# Patient Record
Sex: MALE | Race: BLACK OR AFRICAN AMERICAN
[De-identification: names, ages, dates, MRNs, and addresses within clinical notes are randomized per-mention and may not be internally consistent; named-entity substitution may affect disease eponyms.]

---

## 2022-03-29 ENCOUNTER — HOSPITAL ENCOUNTER (EMERGENCY)
Dept: HOSPITAL 26 - MED | Age: 57
Discharge: HOME | End: 2022-03-29
Payer: COMMERCIAL

## 2022-03-29 VITALS — WEIGHT: 179 LBS | HEIGHT: 69 IN | BODY MASS INDEX: 26.51 KG/M2

## 2022-03-29 VITALS — SYSTOLIC BLOOD PRESSURE: 167 MMHG | DIASTOLIC BLOOD PRESSURE: 100 MMHG

## 2022-03-29 VITALS — DIASTOLIC BLOOD PRESSURE: 118 MMHG | SYSTOLIC BLOOD PRESSURE: 213 MMHG

## 2022-03-29 DIAGNOSIS — R10.9: Primary | ICD-10-CM

## 2022-03-29 DIAGNOSIS — R19.7: ICD-10-CM

## 2022-03-29 DIAGNOSIS — R11.2: ICD-10-CM

## 2022-03-29 PROCEDURE — 81002 URINALYSIS NONAUTO W/O SCOPE: CPT

## 2022-03-29 PROCEDURE — 99285 EMERGENCY DEPT VISIT HI MDM: CPT

## 2022-03-29 PROCEDURE — 96372 THER/PROPH/DIAG INJ SC/IM: CPT

## 2022-04-08 ENCOUNTER — HOSPITAL ENCOUNTER (EMERGENCY)
Dept: HOSPITAL 26 - MED | Age: 57
Discharge: HOME | End: 2022-04-08
Payer: COMMERCIAL

## 2022-04-08 VITALS — SYSTOLIC BLOOD PRESSURE: 187 MMHG | DIASTOLIC BLOOD PRESSURE: 120 MMHG

## 2022-04-08 VITALS — DIASTOLIC BLOOD PRESSURE: 120 MMHG | SYSTOLIC BLOOD PRESSURE: 187 MMHG

## 2022-04-08 VITALS — WEIGHT: 177 LBS | BODY MASS INDEX: 26.22 KG/M2 | HEIGHT: 69 IN

## 2022-04-08 DIAGNOSIS — Z79.899: ICD-10-CM

## 2022-04-08 DIAGNOSIS — I10: ICD-10-CM

## 2022-04-08 DIAGNOSIS — R06.6: Primary | ICD-10-CM

## 2022-04-08 PROCEDURE — 99283 EMERGENCY DEPT VISIT LOW MDM: CPT

## 2022-04-08 PROCEDURE — 96372 THER/PROPH/DIAG INJ SC/IM: CPT

## 2022-04-08 NOTE — NUR
Patient discharged with v/s stable. Written and verbal after care instructions 
given and explained. 

Patient alert, oriented and verbalized understanding of instructions. 
Ambulatory with steady gait. All questions addressed prior to discharge. ID 
band removed. Patient advised to follow up with PMD. Rx of metoclopramide, 
lisinopril given. Patient educated on indication of medication including 
possible reaction and side effects. Opportunity to ask questions provided and 
answered.

## 2022-04-09 ENCOUNTER — HOSPITAL ENCOUNTER (INPATIENT)
Dept: HOSPITAL 26 - MED | Age: 57
LOS: 2 days | Discharge: HOME | DRG: 305 | End: 2022-04-11
Attending: STUDENT IN AN ORGANIZED HEALTH CARE EDUCATION/TRAINING PROGRAM | Admitting: STUDENT IN AN ORGANIZED HEALTH CARE EDUCATION/TRAINING PROGRAM
Payer: COMMERCIAL

## 2022-04-09 VITALS — SYSTOLIC BLOOD PRESSURE: 143 MMHG | DIASTOLIC BLOOD PRESSURE: 92 MMHG

## 2022-04-09 VITALS — SYSTOLIC BLOOD PRESSURE: 189 MMHG | DIASTOLIC BLOOD PRESSURE: 112 MMHG

## 2022-04-09 VITALS — HEIGHT: 69 IN | WEIGHT: 174 LBS | BODY MASS INDEX: 25.77 KG/M2

## 2022-04-09 VITALS — SYSTOLIC BLOOD PRESSURE: 176 MMHG | DIASTOLIC BLOOD PRESSURE: 117 MMHG

## 2022-04-09 DIAGNOSIS — I10: ICD-10-CM

## 2022-04-09 DIAGNOSIS — Z20.822: ICD-10-CM

## 2022-04-09 DIAGNOSIS — F12.10: ICD-10-CM

## 2022-04-09 DIAGNOSIS — I16.1: Primary | ICD-10-CM

## 2022-04-09 DIAGNOSIS — E87.6: ICD-10-CM

## 2022-04-09 DIAGNOSIS — Z79.899: ICD-10-CM

## 2022-04-09 DIAGNOSIS — R11.2: ICD-10-CM

## 2022-04-09 DIAGNOSIS — E83.51: ICD-10-CM

## 2022-04-09 DIAGNOSIS — E78.5: ICD-10-CM

## 2022-04-09 DIAGNOSIS — R06.6: ICD-10-CM

## 2022-04-09 LAB
ALBUMIN FLD-MCNC: 3.7 G/DL (ref 3.4–5)
ANION GAP SERPL CALCULATED.3IONS-SCNC: 12.1 MMOL/L (ref 8–16)
AST SERPL-CCNC: 16 U/L (ref 15–37)
BASOPHILS # BLD AUTO: 0 K/UL (ref 0–0.22)
BASOPHILS NFR BLD AUTO: 0.3 % (ref 0–2)
BILIRUB SERPL-MCNC: 0.7 MG/DL (ref 0–1)
BUN SERPL-MCNC: 17 MG/DL (ref 7–18)
CHLORIDE SERPL-SCNC: 101 MMOL/L (ref 98–107)
CHOLEST/HDLC SERPL: 3.6 {RATIO} (ref 1–4.5)
CO2 SERPL-SCNC: 28.3 MMOL/L (ref 21–32)
CREAT SERPL-MCNC: 1.3 MG/DL (ref 0.6–1.3)
EOSINOPHIL # BLD AUTO: 0 K/UL (ref 0–0.4)
EOSINOPHIL NFR BLD AUTO: 0.3 % (ref 0–4)
ERYTHROCYTE [DISTWIDTH] IN BLOOD BY AUTOMATED COUNT: 14.8 % (ref 11.6–13.7)
GFR SERPL CREATININE-BSD FRML MDRD: 73 ML/MIN (ref 90–?)
GLUCOSE SERPL-MCNC: 112 MG/DL (ref 74–106)
HCT VFR BLD AUTO: 45.8 % (ref 36–52)
HDLC SERPL-MCNC: 54 MG/DL (ref 40–60)
HGB BLD-MCNC: 15.3 G/DL (ref 12–18)
LDLC SERPL CALC-MCNC: 128 MG/DL (ref 60–100)
LYMPHOCYTES # BLD AUTO: 2.6 K/UL (ref 2–11.5)
LYMPHOCYTES NFR BLD AUTO: 37.5 % (ref 20.5–51.1)
MCH RBC QN AUTO: 29 PG (ref 27–31)
MCHC RBC AUTO-ENTMCNC: 33 G/DL (ref 33–37)
MCV RBC AUTO: 86.7 FL (ref 80–94)
MONOCYTES # BLD AUTO: 0.5 K/UL (ref 0.8–1)
MONOCYTES NFR BLD AUTO: 7.9 % (ref 1.7–9.3)
NEUTROPHILS # BLD AUTO: 3.7 K/UL (ref 1.8–7.7)
NEUTROPHILS NFR BLD AUTO: 54 % (ref 42.2–75.2)
PLATELET # BLD AUTO: 352 K/UL (ref 140–450)
POTASSIUM SERPL-SCNC: 3.4 MMOL/L (ref 3.5–5.1)
RBC # BLD AUTO: 5.28 MIL/UL (ref 4.2–6.1)
SODIUM SERPL-SCNC: 138 MMOL/L (ref 136–145)
TRIGL SERPL-MCNC: 58 MG/DL (ref 30–150)
WBC # BLD AUTO: 6.9 K/UL (ref 4.8–10.8)

## 2022-04-09 RX ADMIN — DEXTROSE AND SODIUM CHLORIDE SCH MLS/HR: 5; .9 INJECTION, SOLUTION INTRAVENOUS at 22:33

## 2022-04-09 RX ADMIN — SODIUM CHLORIDE PRN MG: 9 INJECTION, SOLUTION INTRAVENOUS at 16:34

## 2022-04-09 RX ADMIN — SODIUM CHLORIDE PRN MG: 9 INJECTION, SOLUTION INTRAVENOUS at 22:31

## 2022-04-09 NOTE — NUR
ATTEMPTED TO D/C PATIENT, , DR. LYNN MADE AWARE. DR. LYNN REQUESTED WE 
PO CHALLENGE PATIENT AND REASSESS VITALS.

## 2022-04-09 NOTE — NUR
RECEIVED PT FROM ER RN VIA VERONICA, PT IS ALERT, AWAKE AND ORIENTED, ON ROOM AIR, IV LINE 
NOTED ON THE RAC G. 20 ON SALINE LOCK, PT DENIES PAIN AND NO SIGN OF DISTRESS NOTED.

## 2022-04-09 NOTE — NUR
56/M PRESENTS TO ED WITH C/O PERSISTENT HICCUPS, NAUSEA, VOMITING AND DECREASE 
IN APPETITEE FOR 1 WEEK. PATIENT STATES HE WAS SEEN HERE YESTERDAY FOR SAME 
SYMPTOMS AND GIVEN METOCLOPRAMIDE WITH NO RELIEF. PATIENT REPORTS MULTIPLE 
EPISODES OF VOMITING THROUGHOUT THE NIGHT, DENIES PAIN, SOB, CP.

## 2022-04-09 NOTE — NUR
PATIENTS BLOOD PRESSURE 200/104, DR. WORTHINGTON MADE AWARE. GIVEN TORB ORDER FOR 
HYDRALAZINE 20MG Q6H PRN FOR SBP OVER 180, PATIENT MEDICATED PER ORDER.

## 2022-04-09 NOTE — NUR
Patient will be admitted to care of DR. WORTHINGTON. Admited to TELE.  Will go to 
room 112B. Belongings list completed.  BEDSIDE REPORT GIVEN TO MEHRAN.

## 2022-04-09 NOTE — NUR
PATIENT APPEARS TO BE RESTING IN BED WITH EYES CLOSED. PROVIDED BLANKET AND 
LIGHTS DIMMED FOR COMFORT, ALL NEEDS MET AT THIS TIME.

## 2022-04-10 VITALS — SYSTOLIC BLOOD PRESSURE: 165 MMHG | DIASTOLIC BLOOD PRESSURE: 100 MMHG

## 2022-04-10 VITALS — SYSTOLIC BLOOD PRESSURE: 176 MMHG | DIASTOLIC BLOOD PRESSURE: 107 MMHG

## 2022-04-10 VITALS — SYSTOLIC BLOOD PRESSURE: 188 MMHG | DIASTOLIC BLOOD PRESSURE: 117 MMHG

## 2022-04-10 VITALS — SYSTOLIC BLOOD PRESSURE: 170 MMHG | DIASTOLIC BLOOD PRESSURE: 115 MMHG

## 2022-04-10 VITALS — SYSTOLIC BLOOD PRESSURE: 153 MMHG | DIASTOLIC BLOOD PRESSURE: 94 MMHG

## 2022-04-10 VITALS — SYSTOLIC BLOOD PRESSURE: 153 MMHG | DIASTOLIC BLOOD PRESSURE: 91 MMHG

## 2022-04-10 LAB
ANION GAP SERPL CALCULATED.3IONS-SCNC: 9.5 MMOL/L (ref 8–16)
BARBITURATES UR QL SCN: NEGATIVE NG/ML
BASOPHILS # BLD AUTO: 0 K/UL (ref 0–0.22)
BASOPHILS NFR BLD AUTO: 0.5 % (ref 0–2)
BENZODIAZ UR QL SCN: POSITIVE NG/ML
BUN SERPL-MCNC: 19 MG/DL (ref 7–18)
BZE UR QL SCN: NEGATIVE NG/ML
CANNABINOIDS UR QL SCN: POSITIVE NG/ML
CHLORIDE SERPL-SCNC: 103 MMOL/L (ref 98–107)
CO2 SERPL-SCNC: 28.9 MMOL/L (ref 21–32)
CREAT SERPL-MCNC: 1.2 MG/DL (ref 0.6–1.3)
EOSINOPHIL # BLD AUTO: 0 K/UL (ref 0–0.4)
EOSINOPHIL NFR BLD AUTO: 0.5 % (ref 0–4)
ERYTHROCYTE [DISTWIDTH] IN BLOOD BY AUTOMATED COUNT: 14.3 % (ref 11.6–13.7)
GFR SERPL CREATININE-BSD FRML MDRD: 80 ML/MIN (ref 90–?)
GLUCOSE SERPL-MCNC: 137 MG/DL (ref 74–106)
HCT VFR BLD AUTO: 43.6 % (ref 36–52)
HGB BLD-MCNC: 14.6 G/DL (ref 12–18)
LYMPHOCYTES # BLD AUTO: 2.2 K/UL (ref 2–11.5)
LYMPHOCYTES NFR BLD AUTO: 34.5 % (ref 20.5–51.1)
MAGNESIUM SERPL-MCNC: 2.1 MG/DL (ref 1.8–2.4)
MCH RBC QN AUTO: 29 PG (ref 27–31)
MCHC RBC AUTO-ENTMCNC: 33 G/DL (ref 33–37)
MCV RBC AUTO: 86.5 FL (ref 80–94)
MONOCYTES # BLD AUTO: 0.8 K/UL (ref 0.8–1)
MONOCYTES NFR BLD AUTO: 12.8 % (ref 1.7–9.3)
NEUTROPHILS # BLD AUTO: 3.3 K/UL (ref 1.8–7.7)
NEUTROPHILS NFR BLD AUTO: 51.7 % (ref 42.2–75.2)
OPIATES UR QL SCN: NEGATIVE NG/ML
PCP UR QL SCN: NEGATIVE NG/ML
PHOSPHATE SERPL-MCNC: 3.1 MG/DL (ref 2.5–4.9)
PLATELET # BLD AUTO: 339 K/UL (ref 140–450)
POTASSIUM SERPL-SCNC: 4.4 MMOL/L (ref 3.5–5.1)
RBC # BLD AUTO: 5.04 MIL/UL (ref 4.2–6.1)
SODIUM SERPL-SCNC: 137 MMOL/L (ref 136–145)
WBC # BLD AUTO: 6.4 K/UL (ref 4.8–10.8)

## 2022-04-10 RX ADMIN — DEXTROSE AND SODIUM CHLORIDE SCH MLS/HR: 5; .9 INJECTION, SOLUTION INTRAVENOUS at 17:24

## 2022-04-10 RX ADMIN — METOCLOPRAMIDE PRN MG: 5 INJECTION, SOLUTION INTRAMUSCULAR; INTRAVENOUS at 17:51

## 2022-04-10 RX ADMIN — Medication SCH MG: at 21:16

## 2022-04-10 RX ADMIN — SODIUM CHLORIDE PRN MG: 9 INJECTION, SOLUTION INTRAVENOUS at 09:28

## 2022-04-10 RX ADMIN — Medication SCH MG: at 12:21

## 2022-04-10 RX ADMIN — METOCLOPRAMIDE PRN MG: 5 INJECTION, SOLUTION INTRAMUSCULAR; INTRAVENOUS at 12:21

## 2022-04-10 RX ADMIN — DEXTROSE AND SODIUM CHLORIDE SCH MLS/HR: 5; .9 INJECTION, SOLUTION INTRAVENOUS at 12:30

## 2022-04-10 NOTE — NUR
RECEIVED REPORT FROM Poxel. PT A/OX4. NO SOB OR RESPIRATORY DISTRESS. ON RA. RR EVEN & 
UNLABORED. DENIED CHEST PAIN. DENIES HEADACHE. DENIES ANY PAST MEDICAL HISTORY BESIDES HTN. 
STATES VOMIT X1 THIS AM. IVF INFUSING. RAC #20 PATENT, DRESSING C/D/I. NEEDS ALL MET AT THIS 
TIME. SAFETY MEASURES IN PLACE. WILL MONITOR CLOSELY.

## 2022-04-10 NOTE — NUR
PT STATES NO VOMIT AFTER FIRST DOSE OF REGLAN. PRN HYDRALAZINE GIVEN. PT'S WITH HICCUP 
NOTED. WILL ENDORSE BP CHECK TO NIGHTSHIFT.

## 2022-04-10 NOTE — NUR
ECHOCARDIOGRAM COMPLETED AT BEDSIDE. 

-------------------------------------------------------------------------------

Addendum: 04/10/22 at 1245 by Agency Nurse 25, RN RN

-------------------------------------------------------------------------------

PT NOTED WITH NAUSEA. PT VOMITTED. EXTRA VOMIT BAGS PROVIDED. NEEDS ALL MET. SAFETY MEASURES 
IN PLACE. WILL MONITOR CLOSELY.

## 2022-04-10 NOTE — NUR
PT WITH NO SOB OR RESPIRATORY DISTRESS. ON RA. DENIES CHEST PAIN. DENIES HEADACHE, NUMBNESS, 
TINGLING. WIFE AT BEDSIDE. ALL QUESTIONS ANSWERED. NEEDS ALL MET AT THIS TIME. SAFETY 
MEASURES IN PLACE. WILL MONITOR CLOSELY.

## 2022-04-11 VITALS — DIASTOLIC BLOOD PRESSURE: 112 MMHG | SYSTOLIC BLOOD PRESSURE: 175 MMHG

## 2022-04-11 VITALS — SYSTOLIC BLOOD PRESSURE: 166 MMHG | DIASTOLIC BLOOD PRESSURE: 98 MMHG

## 2022-04-11 VITALS — SYSTOLIC BLOOD PRESSURE: 146 MMHG | DIASTOLIC BLOOD PRESSURE: 95 MMHG

## 2022-04-11 VITALS — DIASTOLIC BLOOD PRESSURE: 109 MMHG | SYSTOLIC BLOOD PRESSURE: 169 MMHG

## 2022-04-11 VITALS — SYSTOLIC BLOOD PRESSURE: 187 MMHG | DIASTOLIC BLOOD PRESSURE: 100 MMHG

## 2022-04-11 VITALS — SYSTOLIC BLOOD PRESSURE: 162 MMHG | DIASTOLIC BLOOD PRESSURE: 104 MMHG

## 2022-04-11 RX ADMIN — Medication SCH MG: at 08:21

## 2022-04-11 RX ADMIN — DEXTROSE AND SODIUM CHLORIDE SCH MLS/HR: 5; .9 INJECTION, SOLUTION INTRAVENOUS at 04:51

## 2022-04-11 NOTE — NUR
PATIENT ASLEEP AT THIS TIME. VISIBLE CHEST RISE AND FALL NOTED. WILL CONTINUE TO MONITOR THE 
PATIENT.

## 2022-04-11 NOTE — NUR
DC PLANNING:

THE PATIENT PRESENTED WITH C/O HICCUPS AND N/V X 3 DAYS. PATIENT PRESENTED ONE DAY PRIOR TO 
ADMISSION WITH SAME C/O AND WAS PRESCRIBED REGLAN AND ZOFRAN WITHOUT RESOLUTION. H/O HTN BUT 
DOES NOT TAKE ANTIHYPERTENSIVES. B/P IN /112, GIVEN ZOFRAN, THORAZINE, BENADRYL, 
ZOFRAN AND VALIUM, IVF'S STARTED. SERIAL TROPONINS NEGATIVE, CXR AND CT HEAD WITHOUT ACUTE 
FINDINGS. PATIENT CONTINUED TO HAVE N/V AFTER ADMINISTRATION OF MEDICATIONS AND WAS ADMITTED 
FOR OBSERVATION AND W/U. ORDER FOR CARDIOLOGY CONSULT, ECHO SHOWS EF OF 60-65%, 
RECOMMENDATION BY CARDIOLOGY FOR MEDICATION MANAGEMENT.

DC PLAN IS FOR THE PATIENT TO RETURN HOME WHEN B/P IS WNL'S AND N/V RESOLVED.

CM WILL FOLLOW.

## 2022-04-11 NOTE — NUR
PATIENT /104, HR- 80. PATIENT WAS GIVEN LOPRESSOR EARLIER AND ALSO WAS GIVEN 
HYDRALAZINE BEFORE THE START OF THE SHIFT. WILL CONTINUE TO MONITOR THE PATIENT BP.

## 2022-04-11 NOTE — NUR
DC PATIENT TO HOME VIA WHEELCHAIR WITH DC INSTRUCTION GIVEN AND VERBALIZED UNDERSTANDING. IN 
STABLE CONDITION.

## 2022-04-11 NOTE — NUR
SEEN BY DR. QUINTANILLA MADE AWARE OF BP-187/100, WILL GIVE APRESOLINE IV AS ORDERED. WILL CONTINUE 
TO MONITOR.

## 2022-04-11 NOTE — NUR
PT BP HIGH 175/112, HR-87 , AFEBRILE, SATING 97% ON RA. WILL GIVE PRN MEDICATION AS ORDERED. 
NO COMPLAIN FROM THE PATIENT AT THIS TIME. WILL CONTINUE TO OBSERVE.

## 2022-04-11 NOTE — NUR
RECEIVED REPORT FROM NIGHT SHIFT FOR CONTINUITY OF CARE. PATIENT ALERT AWAKE ORIENTED X4. 
NOT IN ANY DISTRESS NOTED. WITH IVF ON GOING AND INFUSING WELL. ON TELEMETRY MONITOR SHOWS 
SR. DENIES CHEST PAIN, RESTING COMFORTABLY, NEEDS ATTENDED. WILL CONTINUE TO MONITOR.

## 2022-04-11 NOTE — NUR
NO ACUTE EVENT THROUGHOUT THE NIGHT. PATIENT STABLE. NOT IN ANY DISTRESS AND NO COMPLAIN AT 
THIS TIME. WILL ENDORSE THE PATIENT TO THE ONCOMING RN FOR CONTINUITY OF CARE.

## 2022-04-18 ENCOUNTER — HOSPITAL ENCOUNTER (INPATIENT)
Dept: HOSPITAL 26 - MED | Age: 57
LOS: 5 days | Discharge: HOME | DRG: 329 | End: 2022-04-23
Attending: FAMILY MEDICINE | Admitting: FAMILY MEDICINE
Payer: COMMERCIAL

## 2022-04-18 VITALS — WEIGHT: 160 LBS | BODY MASS INDEX: 23.7 KG/M2 | HEIGHT: 69 IN

## 2022-04-18 VITALS — DIASTOLIC BLOOD PRESSURE: 72 MMHG | SYSTOLIC BLOOD PRESSURE: 143 MMHG

## 2022-04-18 VITALS — SYSTOLIC BLOOD PRESSURE: 111 MMHG | DIASTOLIC BLOOD PRESSURE: 77 MMHG

## 2022-04-18 VITALS — DIASTOLIC BLOOD PRESSURE: 70 MMHG | SYSTOLIC BLOOD PRESSURE: 128 MMHG

## 2022-04-18 DIAGNOSIS — C18.9: Primary | ICD-10-CM

## 2022-04-18 DIAGNOSIS — N17.0: ICD-10-CM

## 2022-04-18 DIAGNOSIS — E86.0: ICD-10-CM

## 2022-04-18 DIAGNOSIS — E87.0: ICD-10-CM

## 2022-04-18 DIAGNOSIS — E11.9: ICD-10-CM

## 2022-04-18 DIAGNOSIS — Z79.899: ICD-10-CM

## 2022-04-18 DIAGNOSIS — Z20.822: ICD-10-CM

## 2022-04-18 DIAGNOSIS — E46: ICD-10-CM

## 2022-04-18 LAB
ALBUMIN FLD-MCNC: 3.8 G/DL (ref 3.4–5)
AMYLASE SERPL-CCNC: 76 U/L (ref 25–115)
ANION GAP SERPL CALCULATED.3IONS-SCNC: 15.8 MMOL/L (ref 8–16)
APPEARANCE UR: CLEAR
APPEARANCE UR: CLEAR
AST SERPL-CCNC: 15 U/L (ref 15–37)
BARBITURATES UR QL SCN: NEGATIVE NG/ML
BASOPHILS # BLD AUTO: 0.1 K/UL (ref 0–0.22)
BASOPHILS NFR BLD AUTO: 1.5 % (ref 0–2)
BENZODIAZ UR QL SCN: POSITIVE NG/ML
BILIRUB SERPL-MCNC: 0.6 MG/DL (ref 0–1)
BILIRUB UR QL STRIP: (no result)
BILIRUB UR QL STRIP: (no result)
BUN SERPL-MCNC: 26 MG/DL (ref 7–18)
BZE UR QL SCN: NEGATIVE NG/ML
CANNABINOIDS UR QL SCN: POSITIVE NG/ML
CHLORIDE SERPL-SCNC: 95 MMOL/L (ref 98–107)
CHOLEST/HDLC SERPL: 4.7 {RATIO} (ref 1–4.5)
CO2 SERPL-SCNC: 31.5 MMOL/L (ref 21–32)
COLOR UR: YELLOW
COLOR UR: YELLOW
CREAT SERPL-MCNC: 1.7 MG/DL (ref 0.6–1.3)
EOSINOPHIL # BLD AUTO: 0 K/UL (ref 0–0.4)
EOSINOPHIL NFR BLD AUTO: 0.1 % (ref 0–4)
ERYTHROCYTE [DISTWIDTH] IN BLOOD BY AUTOMATED COUNT: 13.9 % (ref 11.6–13.7)
GFR SERPL CREATININE-BSD FRML MDRD: 53 ML/MIN (ref 90–?)
GLUCOSE SERPL-MCNC: 137 MG/DL (ref 74–106)
GLUCOSE UR STRIP-MCNC: NEGATIVE MG/DL
GLUCOSE UR STRIP-MCNC: NEGATIVE MG/DL
HCT VFR BLD AUTO: 46 % (ref 36–52)
HDLC SERPL-MCNC: 52 MG/DL (ref 40–60)
HGB BLD-MCNC: 15.7 G/DL (ref 12–18)
HGB UR QL STRIP: NEGATIVE
HGB UR QL STRIP: NEGATIVE
LDLC SERPL CALC-MCNC: 176 MG/DL (ref 60–100)
LEUKOCYTE ESTERASE UR QL STRIP: NEGATIVE
LEUKOCYTE ESTERASE UR QL STRIP: NEGATIVE
LIPASE SERPL-CCNC: 48 U/L (ref 73–393)
LIPASE SERPL-CCNC: 54 U/L (ref 73–393)
LYMPHOCYTES # BLD AUTO: 3.2 K/UL (ref 2–11.5)
LYMPHOCYTES NFR BLD AUTO: 36.7 % (ref 20.5–51.1)
MAGNESIUM SERPL-MCNC: 2.4 MG/DL (ref 1.8–2.4)
MCH RBC QN AUTO: 29 PG (ref 27–31)
MCHC RBC AUTO-ENTMCNC: 34 G/DL (ref 33–37)
MCV RBC AUTO: 85.2 FL (ref 80–94)
MONOCYTES # BLD AUTO: 0.6 K/UL (ref 0.8–1)
MONOCYTES NFR BLD AUTO: 6.7 % (ref 1.7–9.3)
NEUTROPHILS # BLD AUTO: 4.8 K/UL (ref 1.8–7.7)
NEUTROPHILS NFR BLD AUTO: 55 % (ref 42.2–75.2)
NITRITE UR QL STRIP: NEGATIVE
NITRITE UR QL STRIP: NEGATIVE
OPIATES UR QL SCN: NEGATIVE NG/ML
PCP UR QL SCN: NEGATIVE NG/ML
PH UR STRIP: 5.5 [PH] (ref 5–9)
PH UR STRIP: 5.5 [PH] (ref 5–9)
PHOSPHATE SERPL-MCNC: 4.2 MG/DL (ref 2.5–4.9)
PLATELET # BLD AUTO: 452 K/UL (ref 140–450)
POTASSIUM SERPL-SCNC: 3.3 MMOL/L (ref 3.5–5.1)
PROTHROMBIN TIME: 10.6 SECS (ref 10.8–13.4)
RBC # BLD AUTO: 5.4 MIL/UL (ref 4.2–6.1)
RBC #/AREA URNS HPF: (no result) /HPF (ref 0–5)
SODIUM SERPL-SCNC: 139 MMOL/L (ref 136–145)
T4 FREE SERPL-MCNC: 1.46 NG/DL (ref 0.76–1.46)
TRIGL SERPL-MCNC: 74 MG/DL (ref 30–150)
TSH SERPL DL<=0.05 MIU/L-ACNC: 2.05 UIU/ML (ref 0.34–3.74)
WBC # BLD AUTO: 8.8 K/UL (ref 4.8–10.8)
WBC,URINE: (no result) /HPF (ref 0–5)

## 2022-04-18 RX ADMIN — SODIUM SULFATE, POTASSIUM SULFATE, MAGNESIUM SULFATE SCH ML: 17.5; 3.13; 1.6 SOLUTION, CONCENTRATE ORAL at 23:22

## 2022-04-18 RX ADMIN — DEXTROSE AND SODIUM CHLORIDE SCH MLS/HR: 5; .9 INJECTION, SOLUTION INTRAVENOUS at 01:55

## 2022-04-18 RX ADMIN — DEXTROSE AND SODIUM CHLORIDE SCH MLS/HR: 5; .9 INJECTION, SOLUTION INTRAVENOUS at 15:30

## 2022-04-18 RX ADMIN — SODIUM SULFATE, POTASSIUM SULFATE, MAGNESIUM SULFATE SCH ML: 17.5; 3.13; 1.6 SOLUTION, CONCENTRATE ORAL at 20:18

## 2022-04-18 NOTE — NUR
PT C/O INTERMITTENT ABDOMINAL PAIN X1 MONTH, WORSE AFTER EATING WITH NAUSEA. IV 
INSERTED TO LEFT AC #20GUAGE

## 2022-04-18 NOTE — NUR
GAVE REPORT TO NIGHT SHIFT NURSE FOR CONTINUITY OF CARE. PATIENT ALET ON BED IV RUNNING AT 
80 CC/HOUR. ALL SAFETY MEASURE IN PLACE. WIFE AT BED SIDE.

## 2022-04-18 NOTE — NUR
RECEIVED PT FROM ER NURSE. PT CAME VIA WHEEL CHAIR. PT IS COMFORTABLE LYING IN BED. PT A&O4, 
VERBALLY RESPONSIVE AND ABLE TO COMMUNICATE NEEDS. BREATHING EVEN AND UNLABORED AT ROOM AIR. 
NO SIGNS OF DISTRESS NOTED. NO COMPLAINTS OF PAIN. IV SITE AT LFA G20. PLACED D5NS AT 80 
ML/HR. SKIN IS INTACT, DRY AND WARM TO TOUCH. V/S TAKEN. CALL LIGHT WITHIN REACH. SAFETY 
PRECAUTIONS IN PLACE. WILL CONTINUE TO MONITOR.

## 2022-04-18 NOTE — NUR
DC PLANNIN YRS OLD MALE PATIENT WAS ADMITTED FROM HOME WITH A DX OF INTRACTABLE N/V COLONIC MASS. 
PATIENT HAS A HX OF HTN. CXR NEGATIVE. CT ABD/ PELVIS SHOWED  CONSTRICTING CIRCUMFERENTIAL 
COLONIC LESION INVOLVING THE STOMACH FLEXURE WHICH IS HIGHLY SUSPICIOUS  FOR MALIGNANCY . 
ADMINISTERED IVF AND PAIN MEDS. CONSULTED WITH GI NEPHRO AND SURGEON. DC PLAN TO GO HOME 
WHEN STABLE. CM TO FOLLOW 

-------------------------------------------------------------------------------

Addendum: 22 at 1503 by Daphnie Kaba RN

-------------------------------------------------------------------------------

DC PLANNING:

PATIENT UNDERWENT OBSTRUCTING TRANSVERSE COLON MASS, LAPAROSCOPIC TRANSVERSE COLECTOMY. HAS 
ORDER FOR COLOSTOMY SUPPLIES FAXED TO THE [employer] AND [employer]. CM TO FOLLOW   

-------------------------------------------------------------------------------

Addendum: 22 at 1249 by Daphnie Kaba RN

-------------------------------------------------------------------------------

DC PLANNING:

CALLED [facility]ST[facility] SUPPLIES  SPOKE WITH HUNTER ,STATED THEY RECEIVED THE 
AUTHORIZATION FOR COLOSTOMY BAGS AND THE DELIVERY TIME WILL BE  BETWEEN 3-5PM. NOTIFIED 
PATIENT AND OLY MONTOYA. ILIA TO FOLLOW

## 2022-04-19 VITALS — SYSTOLIC BLOOD PRESSURE: 121 MMHG | DIASTOLIC BLOOD PRESSURE: 66 MMHG

## 2022-04-19 VITALS — SYSTOLIC BLOOD PRESSURE: 143 MMHG | DIASTOLIC BLOOD PRESSURE: 81 MMHG

## 2022-04-19 VITALS — SYSTOLIC BLOOD PRESSURE: 125 MMHG | DIASTOLIC BLOOD PRESSURE: 69 MMHG

## 2022-04-19 VITALS — DIASTOLIC BLOOD PRESSURE: 85 MMHG | SYSTOLIC BLOOD PRESSURE: 128 MMHG

## 2022-04-19 LAB
ANION GAP SERPL CALCULATED.3IONS-SCNC: 8.4 MMOL/L (ref 8–16)
BASOPHILS # BLD AUTO: 0 K/UL (ref 0–0.22)
BASOPHILS NFR BLD AUTO: 0.5 % (ref 0–2)
BUN SERPL-MCNC: 24 MG/DL (ref 7–18)
CHLORIDE SERPL-SCNC: 107 MMOL/L (ref 98–107)
CO2 SERPL-SCNC: 34.5 MMOL/L (ref 21–32)
CREAT SERPL-MCNC: 1.4 MG/DL (ref 0.6–1.3)
EOSINOPHIL # BLD AUTO: 0.1 K/UL (ref 0–0.4)
EOSINOPHIL NFR BLD AUTO: 1.2 % (ref 0–4)
ERYTHROCYTE [DISTWIDTH] IN BLOOD BY AUTOMATED COUNT: 14.3 % (ref 11.6–13.7)
GFR SERPL CREATININE-BSD FRML MDRD: 67 ML/MIN (ref 90–?)
GLUCOSE SERPL-MCNC: 144 MG/DL (ref 74–106)
HCT VFR BLD AUTO: 39.8 % (ref 36–52)
HGB BLD-MCNC: 13.2 G/DL (ref 12–18)
LYMPHOCYTES # BLD AUTO: 2.3 K/UL (ref 2–11.5)
LYMPHOCYTES NFR BLD AUTO: 44.8 % (ref 20.5–51.1)
MCH RBC QN AUTO: 29 PG (ref 27–31)
MCHC RBC AUTO-ENTMCNC: 33 G/DL (ref 33–37)
MCV RBC AUTO: 86.5 FL (ref 80–94)
MONOCYTES # BLD AUTO: 0.6 K/UL (ref 0.8–1)
MONOCYTES NFR BLD AUTO: 12.7 % (ref 1.7–9.3)
NEUTROPHILS # BLD AUTO: 2.1 K/UL (ref 1.8–7.7)
NEUTROPHILS NFR BLD AUTO: 40.8 % (ref 42.2–75.2)
PLATELET # BLD AUTO: 327 K/UL (ref 140–450)
POTASSIUM SERPL-SCNC: 3.9 MMOL/L (ref 3.5–5.1)
RBC # BLD AUTO: 4.6 MIL/UL (ref 4.2–6.1)
SODIUM SERPL-SCNC: 146 MMOL/L (ref 136–145)
T3RU NFR SERPL: 27 % (ref 24–39)
T4 SERPL-MCNC: 13.1 UG/DL (ref 4.5–12)
WBC # BLD AUTO: 5.1 K/UL (ref 4.8–10.8)

## 2022-04-19 RX ADMIN — DEXTROSE AND SODIUM CHLORIDE SCH MLS/HR: 5; .9 INJECTION, SOLUTION INTRAVENOUS at 20:41

## 2022-04-19 RX ADMIN — DEXTROSE AND SODIUM CHLORIDE SCH MLS/HR: 5; .9 INJECTION, SOLUTION INTRAVENOUS at 15:42

## 2022-04-19 RX ADMIN — DEXTROSE AND SODIUM CHLORIDE SCH MLS/HR: 5; .9 INJECTION, SOLUTION INTRAVENOUS at 06:50

## 2022-04-19 RX ADMIN — PANTOPRAZOLE SODIUM SCH MG: 40 TABLET, DELAYED RELEASE ORAL at 09:02

## 2022-04-19 NOTE — NUR
ENDORSED - PT - STABLE  ENDORSED TO BETZAIDA SANCHEZ THE PT'S DENTURES IS AT BEDSIDE 
AND THE EYEGLASSES .

## 2022-04-19 NOTE — NUR
PATIENT HAS BEEN SCREENED AND CATEGORIZED AS HIGH NUTRITION RISK. PATIENT WILL BE SEEN 
WITHIN 1-2 DAYS OF ADMISSION.



4/19/224/20/22

RECEIVED REFERRAL FOR VOMITING OVER THREE DAYS AND REFUSE TO EAT 



ETHEL PRATT [address]

## 2022-04-19 NOTE — NUR
4/19/22 RD INITIAL ASSESSMENT COMPLETED



PLEASE REFER TO NUTRITION ASSESSMENT UNDER CARE ACTIVITY FOR ESTIMATED NUTRITIONAL NEEDS. 



1. CONTINUE CLEAR LIQUID DIET AS TOLERATED 

2. RECOMMEND ENSURE CLEAR TID PER RD PROTOCOL 

3. WHEN/IF MEDICALLY APPROPRIATE, ADVANCE TO REGULAR DIET AS TOLERATED 

4. RD TO FOLLOW-UP 2-3 DAYS, HIGH RISK 



ETHEL PRATT RD

## 2022-04-19 NOTE — NUR
RECEIVED REPORT TO NIGHT SHIFT NURSE FOR CONTINUITY OF CARE. PATIENT AWAKE ALERT AND 
VERBALLY RESPONSIVE. CONTINUE ON NPO FOR COLONOSCOPY. ON 24 HOUR URINE COLLECTION.

## 2022-04-19 NOTE — NUR
PATIENT BACK FROM COLONOSCOPY ON STABLE CONDITION OR NURSE REPORTED. PATIENT FOUND WITH MASS 
AND SEND FOR BIOPSY.

## 2022-04-19 NOTE — NUR
PATIENT ALERT ABLE TO MAKE NEEDS KNOWN. RESPIRATION EVEN AND NOT LABORED NO SHORTNESS OF 
BREATH. NO SIGN AND SYMPTOMS OF COMPLICATION FROM COLONOSCOPY. NO BLEEDING. NO SIGN AND 
SYMPTOMS OF NAUSEA CALL LIGTH WITH IN EASY REACH.

## 2022-04-19 NOTE — NUR
DR. HERNANDEZ AT [facility] CLARIFY IF PATIENT IS ON 24 URINE COLLECTION AS ENDORSE PER NIGHT SHIFT. 
DR. HERNANDEZ SAID NO ONLY URINE RANDOM.

## 2022-04-20 VITALS — SYSTOLIC BLOOD PRESSURE: 136 MMHG | DIASTOLIC BLOOD PRESSURE: 80 MMHG

## 2022-04-20 VITALS — SYSTOLIC BLOOD PRESSURE: 130 MMHG | DIASTOLIC BLOOD PRESSURE: 77 MMHG

## 2022-04-20 LAB
ANION GAP SERPL CALCULATED.3IONS-SCNC: 6.9 MMOL/L (ref 8–16)
BASOPHILS # BLD AUTO: 0.1 K/UL (ref 0–0.22)
BASOPHILS NFR BLD AUTO: 0.7 % (ref 0–2)
BUN SERPL-MCNC: 12 MG/DL (ref 7–18)
CHLORIDE SERPL-SCNC: 109 MMOL/L (ref 98–107)
CO2 SERPL-SCNC: 31 MMOL/L (ref 21–32)
CREAT SERPL-MCNC: 1.1 MG/DL (ref 0.6–1.3)
CREAT UR-MCNC: 496 MG/DL (ref 30–125)
EOSINOPHIL # BLD AUTO: 0.3 K/UL (ref 0–0.4)
EOSINOPHIL NFR BLD AUTO: 3.4 % (ref 0–4)
ERYTHROCYTE [DISTWIDTH] IN BLOOD BY AUTOMATED COUNT: 14.3 % (ref 11.6–13.7)
GFR SERPL CREATININE-BSD FRML MDRD: 88 ML/MIN (ref 90–?)
GLUCOSE SERPL-MCNC: 119 MG/DL (ref 74–106)
HCT VFR BLD AUTO: 36.2 % (ref 36–52)
HGB BLD-MCNC: 12 G/DL (ref 12–18)
LYMPHOCYTES # BLD AUTO: 2.8 K/UL (ref 2–11.5)
LYMPHOCYTES NFR BLD AUTO: 36.6 % (ref 20.5–51.1)
MCH RBC QN AUTO: 29 PG (ref 27–31)
MCHC RBC AUTO-ENTMCNC: 33 G/DL (ref 33–37)
MCV RBC AUTO: 86.4 FL (ref 80–94)
MONOCYTES # BLD AUTO: 0.5 K/UL (ref 0.8–1)
MONOCYTES NFR BLD AUTO: 6.8 % (ref 1.7–9.3)
NEUTROPHILS # BLD AUTO: 3.9 K/UL (ref 1.8–7.7)
NEUTROPHILS NFR BLD AUTO: 52.5 % (ref 42.2–75.2)
PLATELET # BLD AUTO: 279 K/UL (ref 140–450)
POTASSIUM SERPL-SCNC: 3.9 MMOL/L (ref 3.5–5.1)
RBC # BLD AUTO: 4.19 MIL/UL (ref 4.2–6.1)
SODIUM SERPL-SCNC: 143 MMOL/L (ref 136–145)
SODIUM UR-SCNC: 23 MMOL/L (ref 40–220)
WBC # BLD AUTO: 7.5 K/UL (ref 4.8–10.8)

## 2022-04-20 PROCEDURE — 0DBL4ZZ EXCISION OF TRANSVERSE COLON, PERCUTANEOUS ENDOSCOPIC APPROACH: ICD-10-PCS | Performed by: SURGERY

## 2022-04-20 PROCEDURE — 0DNE4ZZ RELEASE LARGE INTESTINE, PERCUTANEOUS ENDOSCOPIC APPROACH: ICD-10-PCS | Performed by: SURGERY

## 2022-04-20 RX ADMIN — GABAPENTIN SCH MG: 300 CAPSULE ORAL at 14:40

## 2022-04-20 RX ADMIN — ACETAMINOPHEN SCH MG: 500 TABLET ORAL at 17:45

## 2022-04-20 RX ADMIN — HYDROCODONE BITARTRATE AND ACETAMINOPHEN PRN TAB: 7.5; 325 TABLET ORAL at 14:40

## 2022-04-20 RX ADMIN — MORPHINE SULFATE PRN MG: 2 INJECTION, SOLUTION INTRAMUSCULAR; INTRAVENOUS at 18:39

## 2022-04-20 RX ADMIN — ACETAMINOPHEN SCH MG: 500 TABLET ORAL at 20:31

## 2022-04-20 RX ADMIN — DEXTROSE AND SODIUM CHLORIDE SCH MLS/HR: 5; .9 INJECTION, SOLUTION INTRAVENOUS at 05:48

## 2022-04-20 RX ADMIN — GABAPENTIN SCH MG: 300 CAPSULE ORAL at 17:45

## 2022-04-20 RX ADMIN — DEXTROSE AND SODIUM CHLORIDE SCH MLS/HR: 5; .9 INJECTION, SOLUTION INTRAVENOUS at 22:50

## 2022-04-20 RX ADMIN — HYDROCODONE BITARTRATE AND ACETAMINOPHEN PRN TAB: 7.5; 325 TABLET ORAL at 22:37

## 2022-04-20 RX ADMIN — METRONIDAZOLE SCH MLS/HR: 500 SOLUTION INTRAVENOUS at 20:32

## 2022-04-20 RX ADMIN — DEXTROSE AND SODIUM CHLORIDE SCH MLS/HR: 5; .9 INJECTION, SOLUTION INTRAVENOUS at 17:45

## 2022-04-20 RX ADMIN — METRONIDAZOLE SCH MLS/HR: 500 SOLUTION INTRAVENOUS at 21:00

## 2022-04-20 NOTE — NUR
RECEIVED BEDSIDE REPORT FROM DAY SHIFT RN FOR CONTINUITY OF CARE. PT IS AWAKE WITH WIFE BY 
BEDSIDE. PT IS ON RA. IVF RUNNING WITH NS 75 ML/HR. PT IS NOT IN ANY DISTRESS OR AS 
COMPLAINS AT THIS TIME. NO PAIN. BREATHING EVEN AND UNLABORED. CALL LIGHT WITHIN REACH. ALL 
SAFETY MEASURES TAKEN. WILL CONTINUE TO MONITOR THE PT.

## 2022-04-20 NOTE — NUR
PATIENT RETURNED FROM SURGERY WITH RLA OSTOMY, DRESSING INCISION. JOSH PRAT DRAIN RIGHT 
SIDE,  POST SURGERY VITALS 142 81BP/ 76 PULSE,18 RR,97.9 TEMP, 99 PERCENT ON ROOM AIR, 
PATIENT DENIES PAIN AT THIS TIME. WILL CONTINUE TO MONITOR.

## 2022-04-20 NOTE — NUR
RECEIVED PATIENT IN BED ALERT AND ORIENTED X4, GETTING READY TO GO TO SURGERY COLONOSCOPY 
WITH BIOPSY. PATIENT DENIES PAIN. LUNGS CLEAR, BOWELS NORMAL ACTIVE. SKIN WARM DRY AND 
INTACT, PATENT WITH UPPER AND LOWER DENTURES.

## 2022-04-21 VITALS — DIASTOLIC BLOOD PRESSURE: 65 MMHG | SYSTOLIC BLOOD PRESSURE: 147 MMHG

## 2022-04-21 VITALS — DIASTOLIC BLOOD PRESSURE: 88 MMHG | SYSTOLIC BLOOD PRESSURE: 139 MMHG

## 2022-04-21 VITALS — SYSTOLIC BLOOD PRESSURE: 149 MMHG | DIASTOLIC BLOOD PRESSURE: 95 MMHG

## 2022-04-21 LAB
ANION GAP SERPL CALCULATED.3IONS-SCNC: 6.3 MMOL/L (ref 8–16)
BASOPHILS # BLD AUTO: 0 K/UL (ref 0–0.22)
BASOPHILS # BLD AUTO: 0 K/UL (ref 0–0.22)
BASOPHILS NFR BLD AUTO: 0.2 % (ref 0–2)
BASOPHILS NFR BLD AUTO: 0.2 % (ref 0–2)
BUN SERPL-MCNC: 10 MG/DL (ref 7–18)
CHLORIDE SERPL-SCNC: 106 MMOL/L (ref 98–107)
CO2 SERPL-SCNC: 31.6 MMOL/L (ref 21–32)
CREAT SERPL-MCNC: 1.2 MG/DL (ref 0.6–1.3)
EOSINOPHIL # BLD AUTO: 0 K/UL (ref 0–0.4)
EOSINOPHIL # BLD AUTO: 0.1 K/UL (ref 0–0.4)
EOSINOPHIL NFR BLD AUTO: 0.5 % (ref 0–4)
EOSINOPHIL NFR BLD AUTO: 0.7 % (ref 0–4)
ERYTHROCYTE [DISTWIDTH] IN BLOOD BY AUTOMATED COUNT: 13.9 % (ref 11.6–13.7)
ERYTHROCYTE [DISTWIDTH] IN BLOOD BY AUTOMATED COUNT: 14.1 % (ref 11.6–13.7)
GFR SERPL CREATININE-BSD FRML MDRD: 80 ML/MIN (ref 90–?)
GLUCOSE SERPL-MCNC: 118 MG/DL (ref 74–106)
HCT VFR BLD AUTO: 32.4 % (ref 36–52)
HCT VFR BLD AUTO: 32.5 % (ref 36–52)
HGB BLD-MCNC: 10.8 G/DL (ref 12–18)
HGB BLD-MCNC: 11 G/DL (ref 12–18)
LYMPHOCYTES # BLD AUTO: 1.6 K/UL (ref 2–11.5)
LYMPHOCYTES # BLD AUTO: 2 K/UL (ref 2–11.5)
LYMPHOCYTES NFR BLD AUTO: 21.8 % (ref 20.5–51.1)
LYMPHOCYTES NFR BLD AUTO: 25.9 % (ref 20.5–51.1)
MCH RBC QN AUTO: 29 PG (ref 27–31)
MCH RBC QN AUTO: 29 PG (ref 27–31)
MCHC RBC AUTO-ENTMCNC: 33 G/DL (ref 33–37)
MCHC RBC AUTO-ENTMCNC: 34 G/DL (ref 33–37)
MCV RBC AUTO: 86.1 FL (ref 80–94)
MCV RBC AUTO: 86.8 FL (ref 80–94)
MONOCYTES # BLD AUTO: 0.4 K/UL (ref 0.8–1)
MONOCYTES # BLD AUTO: 0.4 K/UL (ref 0.8–1)
MONOCYTES NFR BLD AUTO: 5.5 % (ref 1.7–9.3)
MONOCYTES NFR BLD AUTO: 5.6 % (ref 1.7–9.3)
NEUTROPHILS # BLD AUTO: 5.2 K/UL (ref 1.8–7.7)
NEUTROPHILS # BLD AUTO: 5.3 K/UL (ref 1.8–7.7)
NEUTROPHILS NFR BLD AUTO: 67.8 % (ref 42.2–75.2)
NEUTROPHILS NFR BLD AUTO: 71.8 % (ref 42.2–75.2)
PLATELET # BLD AUTO: 231 K/UL (ref 140–450)
PLATELET # BLD AUTO: 243 K/UL (ref 140–450)
POTASSIUM SERPL-SCNC: 3.9 MMOL/L (ref 3.5–5.1)
RBC # BLD AUTO: 3.74 MIL/UL (ref 4.2–6.1)
RBC # BLD AUTO: 3.77 MIL/UL (ref 4.2–6.1)
SODIUM SERPL-SCNC: 140 MMOL/L (ref 136–145)
WBC # BLD AUTO: 7.4 K/UL (ref 4.8–10.8)
WBC # BLD AUTO: 7.7 K/UL (ref 4.8–10.8)

## 2022-04-21 RX ADMIN — MORPHINE SULFATE PRN MG: 2 INJECTION, SOLUTION INTRAMUSCULAR; INTRAVENOUS at 10:00

## 2022-04-21 RX ADMIN — METRONIDAZOLE SCH MLS/HR: 500 SOLUTION INTRAVENOUS at 12:17

## 2022-04-21 RX ADMIN — ACETAMINOPHEN SCH MG: 500 TABLET ORAL at 07:01

## 2022-04-21 RX ADMIN — MORPHINE SULFATE PRN MG: 2 INJECTION, SOLUTION INTRAMUSCULAR; INTRAVENOUS at 16:53

## 2022-04-21 RX ADMIN — ACETAMINOPHEN SCH MG: 500 TABLET ORAL at 12:14

## 2022-04-21 RX ADMIN — ENOXAPARIN SODIUM SCH MG: 40 INJECTION SUBCUTANEOUS at 08:20

## 2022-04-21 RX ADMIN — ACETAMINOPHEN SCH MG: 500 TABLET ORAL at 20:43

## 2022-04-21 RX ADMIN — GABAPENTIN SCH MG: 300 CAPSULE ORAL at 12:15

## 2022-04-21 RX ADMIN — DEXTROSE AND SODIUM CHLORIDE SCH MLS/HR: 5; .9 INJECTION, SOLUTION INTRAVENOUS at 05:37

## 2022-04-21 RX ADMIN — ACETAMINOPHEN SCH MG: 500 TABLET ORAL at 16:45

## 2022-04-21 RX ADMIN — GABAPENTIN SCH MG: 300 CAPSULE ORAL at 08:19

## 2022-04-21 RX ADMIN — GABAPENTIN SCH MG: 300 CAPSULE ORAL at 16:45

## 2022-04-21 RX ADMIN — METRONIDAZOLE SCH MLS/HR: 500 SOLUTION INTRAVENOUS at 04:18

## 2022-04-21 RX ADMIN — PANTOPRAZOLE SODIUM SCH MG: 40 TABLET, DELAYED RELEASE ORAL at 08:18

## 2022-04-21 RX ADMIN — PANTOPRAZOLE SODIUM SCH MG: 40 TABLET, DELAYED RELEASE ORAL at 08:21

## 2022-04-21 NOTE — NUR
4/21/22 RD FOLLOW UP COMPLETED



PLEASE REFER TO NUTRITION ASSESSMENT UNDER CARE ACTIVITY FOR ESTIMATED NUTRITIONAL NEEDS. 



1. CONTINUE CLEAR LIQUID DIET AS TOLERATED 

2. CONTINUE ENSURE CLEAR TID PER RD PROTOCOL 

3. WHEN/IF MEDICALLY APPROPRIATE, ADVANCE TO REGULAR DIET AS TOLERATED 

4. RD TO FOLLOW-UP 3-5 DAYS, MODERATE RISK 



ETHEL PRATT RD

## 2022-04-21 NOTE — NUR
PATIENT REMAINS IN BED WITHOUT PAIN/DISCOMFORT. NURSING OBSERVED CHEST RISING AND FALL AS 
PATIENT TOOK NORMAL BREATHS WITHOUT DISTRESS. NO NOTED DRAINAGE AT THIS TIME FROM COLOSTOMY. 
YVONNE DRAINAGE STILL NOTED WITH LIGHT SEROSANGUINEOUS DRAINAGE. NO S/SX OF PAIN/DISCOMFORT. 
SIDE RAILS UP X 2 FOR ADJUSTMENT FOR COMFORT. CALL LIGHT WITHIN REACH. [email or web address]

## 2022-04-21 NOTE — NUR
RECEIVED PATIENT, AOX4, SINUS RHYTHM, RESPIRATIONS EVEN AND UNLABORED ON ROOM AIR. PATIENT 
REPORTS PAIN IS TOLERABLE AT THIS TIME. COLOSTOMY BAG TO RLQ CLEAN DRY INTACT WITH 300 CC 
LIQUID STOOL DRAINAGE. YVONNE DRAIN WITH 160 CC SEROSANGUINOUS DRAINAGE. INCISIONS CLEAN DRY 
INTACT COVERED WITH DERMABOND. TOLERATING CLEAR LIQUID DIET WELL WITH NO NAUSEA/VOMITING. IV 
FLUIDS INFUSING. UPDATED PATIENT ON PLAN OF CARE. WILL CONTINUE TO MONITOR.

## 2022-04-21 NOTE — NUR
PATIENT AMBULATED TO BATHROOM, VOIDED 300 CC URINE. AMBULATED IN HALLWAY, TOLERATED ACTIVITY 
WELL WITH NO DIZZINESS/LIGHTHEADEDNESS. PAIN WAS WELL CONTROLLED WITH PRN MORPHINE. RETURNED 
TO BED, SCDS ON, IV FLUIDS INFUSING. WIFE AT BEDSIDE, PROVIDED EDUCATION REGARDING DRAIN AND 
COLOSTOMY CARE. PATIENT AND WIFE VERBALIZED UNDERSTANDING. WILL REINFORCE LEARNING.

## 2022-04-21 NOTE — NUR
PT IS SLEEPING COMFORTABLY IN BED. PT IS NOT IN ANY DISTRESS. BREATHING EVEN AND UNLABORED. 
IVF RUNNING PER MD ORDER. CALL LIGHT WITHIN REACH. ALL SAFETY MEASURES TAKEN. WILL CONTINUE 
TO MONITOR THE PT.

## 2022-04-21 NOTE — NUR
PATIENT IN BED ASLEEP WITHOUT ANY S/SX OF PAIN/DISCOMFORT. CHEST IS RISING AND FALLING 
WITHOUT ACUTE DISTRESS. URINAL IS IN REACH TO VOID IF NEEDED. PATIENT REMAIN CLEAN AND DRY. 
CALL LIGHT IS WITHIN REACH FOR ASSISTANCE. NO NOTED IVPB TO ENDORSE TO RN AT THIS TIME. 
[email or web address]

## 2022-04-21 NOTE — NUR
Pt received on room air at beginning of shift.

SpO2 85%.

Pt states to have taken off nasal cannula to eat.

Nasal cannula placed back on, SpO2 improved.

Follow up treatment given.

No further calls to RT made.

## 2022-04-21 NOTE — NUR
PATIENT IS DOING WELL, FRIEND AT BEDSIDE. STATES THAT PAIN IS TOLERABLE AT THIS TIME. 
TOLERATING CLEAR LIQUID DIET WELL. IV ANTIBIOTICS INFUSING.

## 2022-04-21 NOTE — NUR
RECEIVED PATIENT FROM [facility] FOR CONTINUITY OF CARE. PATIENT IS ON ROOM AIR BREATHING WITH 
NO RESPIRATORY DISCOMFORT OR COMPLAINTS. PATIENT IS VERBAL, ALERT ORIENT X 4. STATED HIS 
PAIN LEVEL WAS AT A 1 AND DOES NOT REQUIRE ANY PAIN MANAGEMENT AT THIS TIME. PATIENT HAD 
WIFE AT BEDSIDE FOR ASSISTANCE BUT WAS REMINDED TO USE THE CALL LIGHT WITHIN REACH FOR ALL 
ASSISTANCE AND NEEDS. BED WAS POSITIONED IN THE LOWEST LEVEL. SIDE RAILS UP X 2 FOR PATIENT 
TO SELF ADJUST. NURSING WILL CHECK IN ON PATIENT DURING THE NIGHT FOR PAIN COLLECT DATA FOR 
MEDICATION MANAGEMENT, EVENING MEDICATION, AND QUALITY OF CARE. [email or web address]

## 2022-04-21 NOTE — NUR
PT TOLERATED DINNER WELL. STATES THAT PAIN IS TOLERABLE AT THIS TIME. YVONNE DRAIN EMPTIED. 
COLOSTOMY BAG WITH SMALL AMOUNT OF DRAINAGE, CLEAN DRY AND INTACT. WIFE AT BEDSIDE. WILL 
ENDORSE TO NIGHT RN.

## 2022-04-22 VITALS — SYSTOLIC BLOOD PRESSURE: 156 MMHG | DIASTOLIC BLOOD PRESSURE: 109 MMHG

## 2022-04-22 VITALS — SYSTOLIC BLOOD PRESSURE: 139 MMHG | DIASTOLIC BLOOD PRESSURE: 91 MMHG

## 2022-04-22 LAB
ANION GAP SERPL CALCULATED.3IONS-SCNC: 8.6 MMOL/L (ref 8–16)
BASOPHILS # BLD AUTO: 0 K/UL (ref 0–0.22)
BASOPHILS NFR BLD AUTO: 0.1 % (ref 0–2)
BUN SERPL-MCNC: 11 MG/DL (ref 7–18)
CHLORIDE SERPL-SCNC: 104 MMOL/L (ref 98–107)
CO2 SERPL-SCNC: 28.2 MMOL/L (ref 21–32)
CREAT SERPL-MCNC: 1.2 MG/DL (ref 0.6–1.3)
EOSINOPHIL # BLD AUTO: 0.1 K/UL (ref 0–0.4)
EOSINOPHIL NFR BLD AUTO: 0.7 % (ref 0–4)
ERYTHROCYTE [DISTWIDTH] IN BLOOD BY AUTOMATED COUNT: 14.2 % (ref 11.6–13.7)
GFR SERPL CREATININE-BSD FRML MDRD: 80 ML/MIN (ref 90–?)
GLUCOSE SERPL-MCNC: 145 MG/DL (ref 74–106)
HCT VFR BLD AUTO: 34.5 % (ref 36–52)
HGB BLD-MCNC: 11.4 G/DL (ref 12–18)
LYMPHOCYTES # BLD AUTO: 2 K/UL (ref 2–11.5)
LYMPHOCYTES NFR BLD AUTO: 23.2 % (ref 20.5–51.1)
MCH RBC QN AUTO: 29 PG (ref 27–31)
MCHC RBC AUTO-ENTMCNC: 33 G/DL (ref 33–37)
MCV RBC AUTO: 86.2 FL (ref 80–94)
MONOCYTES # BLD AUTO: 0.5 K/UL (ref 0.8–1)
MONOCYTES NFR BLD AUTO: 5.6 % (ref 1.7–9.3)
NEUTROPHILS # BLD AUTO: 6.2 K/UL (ref 1.8–7.7)
NEUTROPHILS NFR BLD AUTO: 70.4 % (ref 42.2–75.2)
PLATELET # BLD AUTO: 258 K/UL (ref 140–450)
POTASSIUM SERPL-SCNC: 3.8 MMOL/L (ref 3.5–5.1)
RBC # BLD AUTO: 4 MIL/UL (ref 4.2–6.1)
SODIUM SERPL-SCNC: 137 MMOL/L (ref 136–145)
WBC # BLD AUTO: 8.8 K/UL (ref 4.8–10.8)

## 2022-04-22 RX ADMIN — SODIUM CHLORIDE PRN MG: 9 INJECTION, SOLUTION INTRAVENOUS at 11:30

## 2022-04-22 RX ADMIN — SODIUM CHLORIDE PRN MG: 9 INJECTION, SOLUTION INTRAVENOUS at 13:12

## 2022-04-22 RX ADMIN — ACETAMINOPHEN SCH MG: 500 TABLET ORAL at 07:30

## 2022-04-22 RX ADMIN — ACETAMINOPHEN SCH MG: 500 TABLET ORAL at 11:27

## 2022-04-22 RX ADMIN — ENOXAPARIN SODIUM SCH MG: 40 INJECTION SUBCUTANEOUS at 08:26

## 2022-04-22 RX ADMIN — GABAPENTIN SCH MG: 300 CAPSULE ORAL at 17:11

## 2022-04-22 RX ADMIN — GABAPENTIN SCH MG: 300 CAPSULE ORAL at 08:27

## 2022-04-22 RX ADMIN — ACETAMINOPHEN SCH MG: 500 TABLET ORAL at 17:11

## 2022-04-22 RX ADMIN — ACETAMINOPHEN SCH MG: 500 TABLET ORAL at 21:03

## 2022-04-22 RX ADMIN — PANTOPRAZOLE SODIUM SCH MG: 40 TABLET, DELAYED RELEASE ORAL at 08:27

## 2022-04-22 RX ADMIN — GABAPENTIN SCH MG: 300 CAPSULE ORAL at 13:05

## 2022-04-22 NOTE — NUR
WOUND CARE EVALUATION NOTE:

WOUND CARE DONE WITH THIS 58 Y/O MALE S/P COLECTOMY WITH ILEOSTOMY AND YVONNE DRAIN PLACEMENT 
.PT IS AAX4. ABDOMINAL WOUND CARE AND OSTOMY CARE INSTRUCTIONS EXPLAINED AND DEMONSTRATED TO 
PT. AND WIFE KAREN. PT. AND WIFE VERBALIZES UNDERSTANDING. OSTOMY CARE WRITTEN INSTRUCTIONS 
PROVIDES TO PT AND WIFE.

INTEGUMENTARY:

-MID ABDOMINAL SURGICAL WOUNDS WITH DERMABOND, CLEAN, DRY, NO S/S OF WOUND DEHISCENCE

-RLQ ABD ILEOSTOMY, URIEL STOMA SKIN CLEAN AND INTACT, STOMA MOIST, BEEFY RED, ROUND SHAPE

 1 3/8 (35MM), PROTRUSION 2CM , LUMEN OPENING TO 6 OCLOCK,SUTURES IN PLACE AND SECURED

-RUQ ABD YVONNE DRAIN, SECURED WITH SUTURE, SITE IS CLEAN

RECOMMENDATIONS:

-PRIMARY RN CONTINUE TEACHING WOUND CARE AND OSTOMY CARE.

-MONITOR MID ABDOMEN SURGICAL WOUND KEEP AREA DRY AND CLEAN

-CONTINUE TO MONITOR AND RECORD YVONNE DRAIN, CLEANSE DRAIN SITE WITH NS, PAT DRY AND APPLY DRY 
DRAIN SPOONGE QD AND PRN IF SOILING

-OSTOMY CARE PER PROTOCOL AND DURING OSTOMY CARE PLEASE FOLLOW INSTRUCTION AS BELOW:

-CHECK URIEL STOMA SKIN CONDITION EVERY TIME WAFER CHANGED Q 5 DAYS AND PRN IF DISPLACED

-APPLY SKIN PREP TO URIEL-OSTOMY SKIN

-APPLY STOMA ADHESIVE PAST TO THE WAFER, NEAR THE EDGE OF STOMA SKIN AREA, PAT FLAT. APPLY 
SKIN PREP TO URIEL-STOMA SKIN

- USE 2- PIECES MONICA OSTOMY DEVICES WITH 1 3/8 (35MM), FLEX TO FIT THE STOMA EXACTLY. 
NO SKIN SHOWING. APPLY PRE-SHAPE WAFER TO OSTOMY AND ATTACHED POUCH TO WAFER. CHANGE WAFER 
Q5 DAYS.

-EMPTY AND RINSE POUCH WHEN IT IS 1/2 FULL. CHANGE POUCH Q5 DAYS AND PRN IF LEAK

-MAY DISCHARGE HOME WITH SUPPLIES AS FOLLOWIN COMPLETE POUCH CHANGES

TUBE OF STOMA ADHESIVE PASTE

BOTTLE OF STOMA ADHESIVE POWDER

SKIN PREP WIPES

## 2022-04-22 NOTE — NUR
PT COMPLAINED OF NAUSEA, NON MEDICATION THERAPEUTIC MEASURES PROVIDED, PT GIVEN ICE PACK TO 
BACK OF NECK, AND REPOSITION.

## 2022-04-22 NOTE — NUR
PATIENT IN BED ASLEEP WITHOUT ANY S/SX OF PAIN/DISCOMFORT. HOB ELEVATED FOR COMFORT. CHEST 
IS RISING AND FALLING WITHOUT ACUTE DISTRESS. URINAL IS IN REACH TO VOID IF NEEDED. PATIENT 
REMAIN CLEAN AND DRY. CALL LIGHT IS WITHIN REACH FOR ASSISTANCE. [email or web address]

## 2022-04-22 NOTE — NUR
RECEIVED REPORT FROM NIGHT SHIFT NURSE. PT IS AWAKE. AOX4, PT STABLE. COLOSTOMY BAG AND YVONNE 
DRAIN IN PLACE. PT DENIES PAIN AT THIS TIME.ON RA, WITH UNLABORED BREATHING. NO SIGNS OF 
INFECTION AT THE COLOSTOMY SITE. SALINE LOCK WITH IV ON LEFT FA 18G, AND L AC 20G. SKIN IS 
INTACT. WILL CONTINUE TO MONITOR.

## 2022-04-22 NOTE — NUR
MD CARE TO THE FLOOR TO ENDORSE NEW ORDERS OF SOFT DIET, FOLLOW UP WITH OSTOMY SUPPLIES, 
FOLLOW UP WITH  ON HOME HEALTH CARE. POSSIBLE DISCHARGE FOR THIS WEEKEND. 
PATIENT WAS ENDORSED TO TIANNA ALDRIDGE FOR CONTINUITY OF CARE. PATIENT IS IN BED AND STABLE. 
[email or web address]

## 2022-04-22 NOTE — NUR
RECEIVED REPORT FROM AM NURSE FOR CONTINUITY OF CARE. PATIENT AAOX4 IN BED WELL RESTED. NO 
ACUTE DISTRESS NOTED. RESPIRATION EVEN UNLABORED. NO COMPLAINTS OF PAIN AT THIS TIME. 
ILEOSTOMY BAG DRAINING LIQUID OUTPUT. YVONNE DRAIN INTACT DRAINING BLOODY OUTPUT. SAFETY 
PRECAUTIONS ARE IN PLACE. CALL LIGHT WITHIN REACH. WILL CONTINUE TO MONITOR PT.

## 2022-04-23 VITALS — SYSTOLIC BLOOD PRESSURE: 152 MMHG | DIASTOLIC BLOOD PRESSURE: 88 MMHG

## 2022-04-23 VITALS — DIASTOLIC BLOOD PRESSURE: 91 MMHG | SYSTOLIC BLOOD PRESSURE: 157 MMHG

## 2022-04-23 LAB
ANION GAP SERPL CALCULATED.3IONS-SCNC: 11 MMOL/L (ref 8–16)
BASOPHILS # BLD AUTO: 0 K/UL (ref 0–0.22)
BASOPHILS NFR BLD AUTO: 0.5 % (ref 0–2)
BUN SERPL-MCNC: 14 MG/DL (ref 7–18)
CHLORIDE SERPL-SCNC: 103 MMOL/L (ref 98–107)
CO2 SERPL-SCNC: 25 MMOL/L (ref 21–32)
CREAT SERPL-MCNC: 1.2 MG/DL (ref 0.6–1.3)
EOSINOPHIL # BLD AUTO: 0.2 K/UL (ref 0–0.4)
EOSINOPHIL NFR BLD AUTO: 2.6 % (ref 0–4)
ERYTHROCYTE [DISTWIDTH] IN BLOOD BY AUTOMATED COUNT: 14.4 % (ref 11.6–13.7)
GFR SERPL CREATININE-BSD FRML MDRD: 80 ML/MIN (ref 90–?)
GLUCOSE SERPL-MCNC: 111 MG/DL (ref 74–106)
HCT VFR BLD AUTO: 33.9 % (ref 36–52)
HGB BLD-MCNC: 11.6 G/DL (ref 12–18)
LYMPHOCYTES # BLD AUTO: 2.3 K/UL (ref 2–11.5)
LYMPHOCYTES NFR BLD AUTO: 26.5 % (ref 20.5–51.1)
MCH RBC QN AUTO: 30 PG (ref 27–31)
MCHC RBC AUTO-ENTMCNC: 34 G/DL (ref 33–37)
MCV RBC AUTO: 86.6 FL (ref 80–94)
MONOCYTES # BLD AUTO: 0.6 K/UL (ref 0.8–1)
MONOCYTES NFR BLD AUTO: 7.4 % (ref 1.7–9.3)
NEUTROPHILS # BLD AUTO: 5.4 K/UL (ref 1.8–7.7)
NEUTROPHILS NFR BLD AUTO: 63 % (ref 42.2–75.2)
PLATELET # BLD AUTO: 288 K/UL (ref 140–450)
POTASSIUM SERPL-SCNC: 4 MMOL/L (ref 3.5–5.1)
RBC # BLD AUTO: 3.92 MIL/UL (ref 4.2–6.1)
SODIUM SERPL-SCNC: 135 MMOL/L (ref 136–145)
WBC # BLD AUTO: 8.7 K/UL (ref 4.8–10.8)

## 2022-04-23 RX ADMIN — ACETAMINOPHEN SCH MG: 500 TABLET ORAL at 08:15

## 2022-04-23 RX ADMIN — ACETAMINOPHEN SCH MG: 500 TABLET ORAL at 12:09

## 2022-04-23 RX ADMIN — PANTOPRAZOLE SODIUM SCH MG: 40 TABLET, DELAYED RELEASE ORAL at 08:15

## 2022-04-23 RX ADMIN — GABAPENTIN SCH MG: 300 CAPSULE ORAL at 12:09

## 2022-04-23 RX ADMIN — ENOXAPARIN SODIUM SCH MG: 40 INJECTION SUBCUTANEOUS at 08:20

## 2022-04-23 RX ADMIN — LOPERAMIDE HYDROCHLORIDE SCH MG: 2 CAPSULE ORAL at 12:09

## 2022-04-23 RX ADMIN — LOPERAMIDE HYDROCHLORIDE SCH MG: 2 CAPSULE ORAL at 09:34

## 2022-04-23 RX ADMIN — GABAPENTIN SCH MG: 300 CAPSULE ORAL at 08:16

## 2022-04-23 NOTE — NUR
SPOKE TO [facility] -[phone]. [facility] WILL CALL BACK WITH TRACKING INFORMATION, 
STATING THAT SUPPLIES SHOULD ARRIVE TODAY OR MONDAY AT PT HOME ADDRESS.

## 2022-04-23 NOTE — NUR
RECEIVED REPORT FROM NIGHT SHIFT NURSE. PT IS AWAKE, A&OX4. ON ROOM AIR, WITH BREATHING 
UNLABORED. PT IS AMBULATORY. YVONNE DRAIN AND ILIESTOMY BAG IN PLACE. WILL CONTINUE TO MONITOR.

## 2022-04-23 NOTE — NUR
PT DISCHARGED HOME WITH SPOUSE. PERSONAL BELONGINGS IN POSSESSION. EDUCATED AND PROVIDED 
SUPPLIES FOR OSTIOMY CARE. ALL SAFETY MEASURES IN PLACE. ALL IVS AND ID BANDS REMOVED. PT 
TOLERATED WELL.

## 2022-05-18 ENCOUNTER — HOSPITAL ENCOUNTER (INPATIENT)
Dept: HOSPITAL 26 - MED | Age: 57
LOS: 4 days | Discharge: HOME | DRG: 872 | End: 2022-05-22
Attending: STUDENT IN AN ORGANIZED HEALTH CARE EDUCATION/TRAINING PROGRAM | Admitting: STUDENT IN AN ORGANIZED HEALTH CARE EDUCATION/TRAINING PROGRAM
Payer: COMMERCIAL

## 2022-05-18 VITALS — BODY MASS INDEX: 24.44 KG/M2 | WEIGHT: 165 LBS | HEIGHT: 69 IN

## 2022-05-18 VITALS — SYSTOLIC BLOOD PRESSURE: 173 MMHG | DIASTOLIC BLOOD PRESSURE: 100 MMHG

## 2022-05-18 DIAGNOSIS — I10: ICD-10-CM

## 2022-05-18 DIAGNOSIS — C18.9: ICD-10-CM

## 2022-05-18 DIAGNOSIS — Z20.822: ICD-10-CM

## 2022-05-18 DIAGNOSIS — K42.9: ICD-10-CM

## 2022-05-18 DIAGNOSIS — Z93.3: ICD-10-CM

## 2022-05-18 DIAGNOSIS — A41.9: Primary | ICD-10-CM

## 2022-05-18 DIAGNOSIS — Z90.49: ICD-10-CM

## 2022-05-18 DIAGNOSIS — D72.829: ICD-10-CM

## 2022-05-18 DIAGNOSIS — Z85.038: ICD-10-CM

## 2022-05-18 DIAGNOSIS — F12.90: ICD-10-CM

## 2022-05-18 DIAGNOSIS — K56.609: ICD-10-CM

## 2022-05-18 DIAGNOSIS — E78.2: ICD-10-CM

## 2022-05-18 LAB
ALBUMIN FLD-MCNC: 3.5 G/DL (ref 3.4–5)
ANION GAP SERPL CALCULATED.3IONS-SCNC: 14.9 MMOL/L (ref 8–16)
AST SERPL-CCNC: 16 U/L (ref 15–37)
BASOPHILS # BLD AUTO: 0.1 K/UL (ref 0–0.22)
BASOPHILS NFR BLD AUTO: 0.6 % (ref 0–2)
BILIRUB SERPL-MCNC: 0.3 MG/DL (ref 0–1)
BUN SERPL-MCNC: 15 MG/DL (ref 7–18)
CHLORIDE SERPL-SCNC: 100 MMOL/L (ref 98–107)
CO2 SERPL-SCNC: 28.6 MMOL/L (ref 21–32)
CREAT SERPL-MCNC: 1.1 MG/DL (ref 0.6–1.3)
EOSINOPHIL # BLD AUTO: 0.2 K/UL (ref 0–0.4)
EOSINOPHIL NFR BLD AUTO: 1.2 % (ref 0–4)
ERYTHROCYTE [DISTWIDTH] IN BLOOD BY AUTOMATED COUNT: 15.5 % (ref 11.6–13.7)
GFR SERPL CREATININE-BSD FRML MDRD: 88 ML/MIN (ref 90–?)
GLUCOSE SERPL-MCNC: 194 MG/DL (ref 74–106)
HCT VFR BLD AUTO: 39.6 % (ref 36–52)
HGB BLD-MCNC: 13 G/DL (ref 12–18)
LIPASE SERPL-CCNC: 59 U/L (ref 73–393)
LYMPHOCYTES # BLD AUTO: 3.9 K/UL (ref 2–11.5)
LYMPHOCYTES NFR BLD AUTO: 27.6 % (ref 20.5–51.1)
MCH RBC QN AUTO: 28 PG (ref 27–31)
MCHC RBC AUTO-ENTMCNC: 33 G/DL (ref 33–37)
MCV RBC AUTO: 86.5 FL (ref 80–94)
MONOCYTES # BLD AUTO: 0.4 K/UL (ref 0.8–1)
MONOCYTES NFR BLD AUTO: 3.1 % (ref 1.7–9.3)
NEUTROPHILS # BLD AUTO: 9.6 K/UL (ref 1.8–7.7)
NEUTROPHILS NFR BLD AUTO: 67.5 % (ref 42.2–75.2)
PLATELET # BLD AUTO: 287 K/UL (ref 140–450)
POTASSIUM SERPL-SCNC: 3.5 MMOL/L (ref 3.5–5.1)
RBC # BLD AUTO: 4.57 MIL/UL (ref 4.2–6.1)
SODIUM SERPL-SCNC: 140 MMOL/L (ref 136–145)
WBC # BLD AUTO: 14.3 K/UL (ref 4.8–10.8)

## 2022-05-18 PROCEDURE — C9113 INJ PANTOPRAZOLE SODIUM, VIA: HCPCS

## 2022-05-18 NOTE — NUR
PT GIVEN WATER

-------------------------------------------------------------------------------

Addendum: 05/18/22 at 2328 by [email or web address]

-------------------------------------------------------------------------------

DISCONTINUED

## 2022-05-18 NOTE — NUR
58 YO M BIBS W C/O OF SHARP ABD PAIN AND CONSTIPATION X 1 DAY, N/V TWICE TODAY, 
DENIES SOB, FEVER, PAIN DURING URINATION.



PMH: HTN

NKDA

## 2022-05-19 VITALS — DIASTOLIC BLOOD PRESSURE: 128 MMHG | SYSTOLIC BLOOD PRESSURE: 169 MMHG

## 2022-05-19 VITALS — SYSTOLIC BLOOD PRESSURE: 156 MMHG | DIASTOLIC BLOOD PRESSURE: 103 MMHG

## 2022-05-19 VITALS — DIASTOLIC BLOOD PRESSURE: 117 MMHG | SYSTOLIC BLOOD PRESSURE: 188 MMHG

## 2022-05-19 LAB
ALBUMIN FLD-MCNC: 3.4 G/DL (ref 3.4–5)
AMYLASE SERPL-CCNC: 97 U/L (ref 25–115)
ANION GAP SERPL CALCULATED.3IONS-SCNC: 9.3 MMOL/L (ref 8–16)
AST SERPL-CCNC: 11 U/L (ref 15–37)
BASOPHILS # BLD AUTO: 0 K/UL (ref 0–0.22)
BASOPHILS NFR BLD AUTO: 0.2 % (ref 0–2)
BILIRUB SERPL-MCNC: 0.4 MG/DL (ref 0–1)
BUN SERPL-MCNC: 18 MG/DL (ref 7–18)
CHLORIDE SERPL-SCNC: 102 MMOL/L (ref 98–107)
CHOLEST/HDLC SERPL: 3.4 {RATIO} (ref 1–4.5)
CO2 SERPL-SCNC: 30.6 MMOL/L (ref 21–32)
CREAT SERPL-MCNC: 1.1 MG/DL (ref 0.6–1.3)
EOSINOPHIL # BLD AUTO: 0 K/UL (ref 0–0.4)
EOSINOPHIL NFR BLD AUTO: 0 % (ref 0–4)
ERYTHROCYTE [DISTWIDTH] IN BLOOD BY AUTOMATED COUNT: 15.5 % (ref 11.6–13.7)
GFR SERPL CREATININE-BSD FRML MDRD: 88 ML/MIN (ref 90–?)
GLUCOSE SERPL-MCNC: 134 MG/DL (ref 74–106)
HCT VFR BLD AUTO: 38.6 % (ref 36–52)
HDLC SERPL-MCNC: 64 MG/DL (ref 40–60)
HGB BLD-MCNC: 12.8 G/DL (ref 12–18)
LDLC SERPL CALC-MCNC: 143 MG/DL (ref 60–100)
LYMPHOCYTES # BLD AUTO: 2.6 K/UL (ref 2–11.5)
LYMPHOCYTES NFR BLD AUTO: 23.8 % (ref 20.5–51.1)
MAGNESIUM SERPL-MCNC: 1.9 MG/DL (ref 1.8–2.4)
MCH RBC QN AUTO: 29 PG (ref 27–31)
MCHC RBC AUTO-ENTMCNC: 33 G/DL (ref 33–37)
MCV RBC AUTO: 86.4 FL (ref 80–94)
MONOCYTES # BLD AUTO: 1 K/UL (ref 0.8–1)
MONOCYTES NFR BLD AUTO: 9.2 % (ref 1.7–9.3)
NEUTROPHILS # BLD AUTO: 7.2 K/UL (ref 1.8–7.7)
NEUTROPHILS NFR BLD AUTO: 66.8 % (ref 42.2–75.2)
PHOSPHATE SERPL-MCNC: 4 MG/DL (ref 2.5–4.9)
PLATELET # BLD AUTO: 272 K/UL (ref 140–450)
POTASSIUM SERPL-SCNC: 3.9 MMOL/L (ref 3.5–5.1)
PROTHROMBIN TIME: 10.4 SECS (ref 10.8–13.4)
RBC # BLD AUTO: 4.47 MIL/UL (ref 4.2–6.1)
SODIUM SERPL-SCNC: 138 MMOL/L (ref 136–145)
T4 FREE SERPL-MCNC: 1.06 NG/DL (ref 0.76–1.46)
TRIGL SERPL-MCNC: 54 MG/DL (ref 30–150)
TSH SERPL DL<=0.05 MIU/L-ACNC: 2.19 UIU/ML (ref 0.34–3.74)
WBC # BLD AUTO: 10.8 K/UL (ref 4.8–10.8)

## 2022-05-19 RX ADMIN — PANTOPRAZOLE SODIUM SCH MG: 40 INJECTION, POWDER, FOR SOLUTION INTRAVENOUS at 14:07

## 2022-05-19 RX ADMIN — DEXTROSE AND SODIUM CHLORIDE SCH MLS/HR: 5; .9 INJECTION, SOLUTION INTRAVENOUS at 13:45

## 2022-05-19 RX ADMIN — DEXTROSE AND SODIUM CHLORIDE SCH MLS/HR: 5; .9 INJECTION, SOLUTION INTRAVENOUS at 14:45

## 2022-05-19 RX ADMIN — DEXTROSE AND SODIUM CHLORIDE SCH MLS/HR: 5; .9 INJECTION, SOLUTION INTRAVENOUS at 22:35

## 2022-05-19 RX ADMIN — MORPHINE SULFATE PRN MG: 2 INJECTION, SOLUTION INTRAMUSCULAR; INTRAVENOUS at 21:57

## 2022-05-19 NOTE — NUR
PATIENT HAS BEEN SCREENED AND CATEGORIZED AS MODERATE NUTRITION RISK. PATIENT WILL BE SEEN 
WITHIN 3-5 DAYS OF ADMISSION.



5/21/225/23/22



ETHEL PRATT RD

## 2022-05-19 NOTE — NUR
DC PLANNING:

THE PATIENT PRESENTED FROM HOME WITH C/O ABDOMINAL PAIN, PATIENT IS S/P COLECTOMY AND MASS 
REMOVAL WITH ILEOSTOMY RELATED TO COLON CA X 1 MONTH. CT ABDOMEN SHOWS MULTIPLE DILATED 
LOOPS OF SMALL BOWEL, SMALL VOLUME OF ABDOMINAL AND PELVIC FREE FLUID. SRG CONSULT NOTES 
ILEUS VS SBO AND PLANS FOR REVERSAL OF ILEOSTOMY IN JUNE. PATIENT WITH NGT, ORDERS FOR SMALL 
BOWEL FOLLOW THROUGH, EXAM PENDING. PATIENT ON PROTONIX IV, ROCEPHIN, IVF'S, DILAUDID AND 
MORPHINE PRN.

CM SPOKE WITH THE PATIENT AT BEDSIDE AND CONFIRMED HIS ADDRESS AND PHONE NUMBER, HIS WIFE 
KAREN IS HIS PRIMARY CONTACT. HE STATES THAT HE DIDN'T HAVE HOME HEALTH FOLLOW UP BECAUSE 
EACH VISIT WAS $120 UNDER HIS CURRENT INSURANCE. HE HAS BEEN WALKING A LITTLE INSIDE HIS 
APARTMENT AND STATES HE'S BEEN EATING WELL. HIS WIFE MANAGES HIS COLOSTOMY AND ASSISTS WITH 
HIS NEEDS. THE PATIENT WAS TIRED DURING THE CONVERSATION, CM ASKED IF HIS WIFE COULD BE 
CONTACTED FOR ANY OTHER INFORMATION NEEDED AND THE PATIENT STATED THAT SHE COULD.

ILIA THEN SPOKE WITH HIS WIFE KAREN, THE PATIENT DOES REQUIRE SOME ASSISTANCE WITH ADL'S AND IS 
AMBULATING A LITTLE BUT IS EASILY FATIGUED. KAREN MANAGES HIS COLOSTOMY SITE AND CHANGES THE 
BAGS. THE OSTOMY PROVIDER WAS CHANGED TO [facility] ([phone]) FOR 
INSURANCE REASONS, THE PATIENT WAS REFERRED THERE BY DR VALENTE. THE PATIENT IS IN THE PROCESS 
OF CHANGING HIS PCP AS DR LIZ WAS NOT WITH HIS INSURANCE AND IS BEING ASSISTED WITH THIS 
BY THE  AT HIS JOB. 

DC PLAN IS FOR THE PATIENT TO RETURN HOME WHEN CLINICALLY STABLE, CM WILL FOLLOW.

-------------------------------------------------------------------------------

Addendum: 05/20/22 at 1412 by Natalia Saucedo CM

-------------------------------------------------------------------------------

DC PLANNING:

PATIENTS ANION GAP INCREASED TO 11.7, CR INCREASED TO 1.4. NGT IN PLACE WITH OP  ML, 
WAITING FOR SURGEON TO DETERMINE INTERVENTION RELATED TO SBFT RESULTS OF BOWEL OBSTRUCTION 
VS SEVERE ILEUS. PATIENT REMAINS ON PROTONIX IV, ROCEPHIN, DILAUDID AND MORPHINE PRN.

CM WILL FOLLOW.

## 2022-05-19 NOTE — NUR
# 14 FR NG tube placed to right nare.  Placement checked by auscultation of 
instilled air into stomach and aspiration of gastric contents.  Tubing taped in 
place to prevent dislodging.  Patient tolerated well.

## 2022-05-19 NOTE — NUR
PATIENT IS LYING ON BED, NO ANY COMPLAIN OF PAIN OR SHORTNESS OF BREATH AT THIS TIME, CALL 
LIGHT IS WITHIN THE REACH ,WILL CONTINUE TO MONITOR PATIENT.

## 2022-05-19 NOTE — NUR
GET THE REPORT FROM MORNING NURSE, PATIENT IS ALERT ORIENTED X4 , PATIENT IS ON NG TUBE WITH 
CONTINUOS SUCTION , CALL LIGHT IS WITHIN THE REACH , WILL CONTINUE TO MONITOR PATIENT.

## 2022-05-19 NOTE — NUR
APatient will be admitted to care of [facility]. Admited to TELE.  Will go to room 
111B. Belongings list completed.  Report to RN.

## 2022-05-19 NOTE — NUR
RADIOLOGY CALLED TO NOTIFY MD WHO ORDERED SMALL BOWEL FOLLOW THROUGH IF HE WANTS IT REPETED 
SINCE THE PATIENT DID VOMIT PART OF THE CONTRAST . WILL CALL MD.

## 2022-05-19 NOTE — NUR
-------------------------------------------------------------------------------

           *** Note rogelio in EDM - 05/19/22 at 0448 by [email or web address] ***            

-------------------------------------------------------------------------------

# 14 FR NG tube placed to right nare.  Placement checked by auscultation of 
instilled air into stomach and aspiration of gastric contents.  Tubing taped in 
place to prevent dislodging.  Patient tolerated well.

## 2022-05-19 NOTE — NUR
ABOUT PATIENT 6 HOUR SMALL BOWEL FOLLOW THROUGH DID NOT COMPLETED IN MORNING SHIFT DUE TO 
PATIENT VOMITED CONTRAST, X-RAY NURSE SENDRA CAME FOR CALCIFICATION TO DO IT AGAIN OR STOPE 
THE TEST, NURSE SENDRA TALK TO DR PARHAM ABOUT HOLE SITUATION AND DR PARHAM SAID JUST FINISH THE 
TEST HERE ,WILL CONTINUE TO MONITOR PATIENT.

## 2022-05-20 VITALS — SYSTOLIC BLOOD PRESSURE: 172 MMHG | DIASTOLIC BLOOD PRESSURE: 121 MMHG

## 2022-05-20 VITALS — SYSTOLIC BLOOD PRESSURE: 159 MMHG | DIASTOLIC BLOOD PRESSURE: 90 MMHG

## 2022-05-20 VITALS — SYSTOLIC BLOOD PRESSURE: 171 MMHG | DIASTOLIC BLOOD PRESSURE: 110 MMHG

## 2022-05-20 VITALS — DIASTOLIC BLOOD PRESSURE: 127 MMHG | SYSTOLIC BLOOD PRESSURE: 170 MMHG

## 2022-05-20 VITALS — DIASTOLIC BLOOD PRESSURE: 118 MMHG | SYSTOLIC BLOOD PRESSURE: 164 MMHG

## 2022-05-20 VITALS — DIASTOLIC BLOOD PRESSURE: 77 MMHG | SYSTOLIC BLOOD PRESSURE: 127 MMHG

## 2022-05-20 LAB
ANION GAP SERPL CALCULATED.3IONS-SCNC: 11.7 MMOL/L (ref 8–16)
APPEARANCE UR: CLEAR
BARBITURATES UR QL SCN: NEGATIVE NG/ML
BASOPHILS # BLD AUTO: 0 K/UL (ref 0–0.22)
BASOPHILS NFR BLD AUTO: 0.1 % (ref 0–2)
BENZODIAZ UR QL SCN: POSITIVE NG/ML
BILIRUB UR QL STRIP: NEGATIVE
BUN SERPL-MCNC: 26 MG/DL (ref 7–18)
BZE UR QL SCN: NEGATIVE NG/ML
CANNABINOIDS UR QL SCN: POSITIVE NG/ML
CHLORIDE SERPL-SCNC: 105 MMOL/L (ref 98–107)
CO2 SERPL-SCNC: 29.8 MMOL/L (ref 21–32)
COLOR UR: YELLOW
CREAT SERPL-MCNC: 1.4 MG/DL (ref 0.6–1.3)
EOSINOPHIL # BLD AUTO: 0 K/UL (ref 0–0.4)
EOSINOPHIL NFR BLD AUTO: 0 % (ref 0–4)
ERYTHROCYTE [DISTWIDTH] IN BLOOD BY AUTOMATED COUNT: 15.9 % (ref 11.6–13.7)
GFR SERPL CREATININE-BSD FRML MDRD: 67 ML/MIN (ref 90–?)
GLUCOSE SERPL-MCNC: 194 MG/DL (ref 74–106)
GLUCOSE UR STRIP-MCNC: NEGATIVE MG/DL
HCT VFR BLD AUTO: 44.7 % (ref 36–52)
HGB BLD-MCNC: 14.9 G/DL (ref 12–18)
HGB UR QL STRIP: NEGATIVE
LEUKOCYTE ESTERASE UR QL STRIP: NEGATIVE
LYMPHOCYTES # BLD AUTO: 2.3 K/UL (ref 2–11.5)
LYMPHOCYTES NFR BLD AUTO: 31.4 % (ref 20.5–51.1)
MCH RBC QN AUTO: 29 PG (ref 27–31)
MCHC RBC AUTO-ENTMCNC: 33 G/DL (ref 33–37)
MCV RBC AUTO: 86.5 FL (ref 80–94)
MONOCYTES # BLD AUTO: 1 K/UL (ref 0.8–1)
MONOCYTES NFR BLD AUTO: 13.6 % (ref 1.7–9.3)
NEUTROPHILS # BLD AUTO: 4.1 K/UL (ref 1.8–7.7)
NEUTROPHILS NFR BLD AUTO: 54.9 % (ref 42.2–75.2)
NITRITE UR QL STRIP: NEGATIVE
OPIATES UR QL SCN: POSITIVE NG/ML
PCP UR QL SCN: NEGATIVE NG/ML
PH UR STRIP: 5.5 [PH] (ref 5–9)
PLATELET # BLD AUTO: 314 K/UL (ref 140–450)
POTASSIUM SERPL-SCNC: 4.5 MMOL/L (ref 3.5–5.1)
RBC # BLD AUTO: 5.17 MIL/UL (ref 4.2–6.1)
SODIUM SERPL-SCNC: 142 MMOL/L (ref 136–145)
T3RU NFR SERPL: 26 % (ref 24–39)
T4 SERPL-MCNC: 9.9 UG/DL (ref 4.5–12)
WBC # BLD AUTO: 7.4 K/UL (ref 4.8–10.8)

## 2022-05-20 RX ADMIN — MORPHINE SULFATE PRN MG: 2 INJECTION, SOLUTION INTRAMUSCULAR; INTRAVENOUS at 22:47

## 2022-05-20 RX ADMIN — DEXTROSE AND SODIUM CHLORIDE SCH MLS/HR: 5; .9 INJECTION, SOLUTION INTRAVENOUS at 07:07

## 2022-05-20 RX ADMIN — DEXTROSE AND SODIUM CHLORIDE SCH MLS/HR: 5; .9 INJECTION, SOLUTION INTRAVENOUS at 15:57

## 2022-05-20 RX ADMIN — DEXTROSE AND SODIUM CHLORIDE SCH MLS/HR: 5; .9 INJECTION, SOLUTION INTRAVENOUS at 22:45

## 2022-05-20 RX ADMIN — PANTOPRAZOLE SODIUM SCH MG: 40 INJECTION, POWDER, FOR SOLUTION INTRAVENOUS at 08:55

## 2022-05-20 RX ADMIN — MORPHINE SULFATE PRN MG: 2 INJECTION, SOLUTION INTRAMUSCULAR; INTRAVENOUS at 04:52

## 2022-05-20 NOTE — NUR
RECEIVED PATIENT FROM NIGHT SHIFT NURSE OF CONTINUITY OF CARE. PT IS AOX4, ABLE TO MAKE 
NEEDS KNOWN. RESPIRATIONS EVEN AND UNLABORED. ON ROOM AIR AND NO DISTRESS NOTED. HAS NG TUBE 
IN PLACE ON LOW INTERMITTENT SUCTION. SKIN IS WARM, DRY, AND INTACT. IV SITE ON LFA 22G 
INFUSING FLUIDS AS ORDERED. HAS RIGHT COLOSTOMY BAG IN PLACE. DENIES PAIN AT THE MOMENT. 
PLAN OF CARE DISCUSSED. SAFETY PRECAUTIONS IN PLACE. CALL LIGHT WITHIN REACH. WILL CONTINUE 
TO MONITOR.

## 2022-05-20 NOTE — NUR
2030 VS: CI=121/110 P- 114, RR-18, T-98.1, O2 SAT=96%. BP HIGH RECEIVED APRESOLINE 10MG IVP. 
WILL REASSESS AT 2345. PT C/.EPIGASTRIC PAIN 7/10 AND INSOMNIA. RECEIVED MSO4 3MG IVP. FOR 
PAIN AND AMBIEN 5MG FOR SLEEP. WILL CONTINUE TO MONITOR.

## 2022-05-20 NOTE — NUR
REASSESSED PT VS: BP NOW DOWN /98. PULSE IS STILL HIGH . PT RESTING RR EVEN AND 
UNLABORED WITH EQUAL CHEST RISE.NAD. PAIN NOW A ZERO. CONTINUE FREQ ROUNDS.

## 2022-05-20 NOTE — NUR
REASSESS BLOOD PRESSURE 164/124 , CALL LIGHT IS WITHIN THE REACH ,WILL CONTINUE TO MONITOR 
PATIENT.

## 2022-05-20 NOTE — NUR
NO RESIDUAL NOTED FROM NG TUBE. REMOVED NG TUBE PER DR. VALENTE. WILL ORDER PATIENT A CLEAR 
DIET PER DR. VALENTE

## 2022-05-20 NOTE — NUR
PATIENT PULL OUT IV, WILL ASSESS PATIENT AND INSERT NEW IV 

-------------------------------------------------------------------------------

Addendum: 05/20/22 at 0347 by Yaa Ramos RN

-------------------------------------------------------------------------------

IT WAS PATIENT  BED A , ACCIDENTLY PUT IN WRONG PATIENT

## 2022-05-20 NOTE — NUR
SPOKE TO DR. VALENTE. RECEIVED ORDERS TO CLAMP NG TUBE AND CHECK RESIDUAL BEFORE 1900. IF 
RESIDUAL IS LESS THAN 300 ML REMOVE NG TUBE AND START ON CLEAR LIQUID DIET.

## 2022-05-20 NOTE — NUR
RECEIVED PT REPORT FROM DAY SHIFT NURSE FOR CONTINUITY OF CARE. PT IS STABLE IN BED. A&OX4. 
DENIES PAIN. ON RM AIR. NAD. RREVEN AND UNLABORED. GI WITH COLOSTOMY ON R SIDE WITH RED 
BEEFY STOMA. OTHERWISE PT'S SKIN INTACT. PT IS AMBULATORY AND CONTINENT OF URINE. DIET JUST 
CHANGED TO CLEAR LIQUIDS. REMINDED PT TO TAKE IT SLOW AND REPORT ANY DIFFICULTY. ALL SAFETY 
MEASURES IN PLACE. BED IN LOW AND LOCKED POSITION . WIFE VISITING .CALL LIGHT WITHIN REACH. 
WILL CONTINUE TO MONITOR.

## 2022-05-20 NOTE — NUR
PATIENT BLOOD PRESSURE ,121 PRN HYDRALAZINE 10 MG 0.5 ML IS GIVEN AS PER DOCTOR ORDER, 
CALL LIGHT IS WITHIN THE REACH ,WILL CONTINUE TO MONITOR PATIENT.

## 2022-05-21 VITALS — SYSTOLIC BLOOD PRESSURE: 146 MMHG | DIASTOLIC BLOOD PRESSURE: 93 MMHG

## 2022-05-21 VITALS — DIASTOLIC BLOOD PRESSURE: 94 MMHG | SYSTOLIC BLOOD PRESSURE: 146 MMHG

## 2022-05-21 VITALS — SYSTOLIC BLOOD PRESSURE: 150 MMHG | DIASTOLIC BLOOD PRESSURE: 98 MMHG

## 2022-05-21 VITALS — SYSTOLIC BLOOD PRESSURE: 150 MMHG | DIASTOLIC BLOOD PRESSURE: 92 MMHG

## 2022-05-21 VITALS — SYSTOLIC BLOOD PRESSURE: 152 MMHG | DIASTOLIC BLOOD PRESSURE: 93 MMHG

## 2022-05-21 VITALS — DIASTOLIC BLOOD PRESSURE: 106 MMHG | SYSTOLIC BLOOD PRESSURE: 147 MMHG

## 2022-05-21 LAB
ANION GAP SERPL CALCULATED.3IONS-SCNC: 10.8 MMOL/L (ref 8–16)
BUN SERPL-MCNC: 21 MG/DL (ref 7–18)
CHLORIDE SERPL-SCNC: 111 MMOL/L (ref 98–107)
CO2 SERPL-SCNC: 27.9 MMOL/L (ref 21–32)
CREAT SERPL-MCNC: 1.2 MG/DL (ref 0.6–1.3)
EOSINOPHIL NFR BLD MANUAL: 1 % (ref 0–4)
ERYTHROCYTE [DISTWIDTH] IN BLOOD BY AUTOMATED COUNT: 15.5 % (ref 11.6–13.7)
GFR SERPL CREATININE-BSD FRML MDRD: 80 ML/MIN (ref 90–?)
GLUCOSE SERPL-MCNC: 127 MG/DL (ref 74–106)
HCT VFR BLD AUTO: 34.8 % (ref 36–52)
HGB BLD-MCNC: 11.6 G/DL (ref 12–18)
LYMPHOCYTES NFR BLD MANUAL: 45 % (ref 20–46)
MCH RBC QN AUTO: 29 PG (ref 27–31)
MCHC RBC AUTO-ENTMCNC: 33 G/DL (ref 33–37)
MCV RBC AUTO: 86.6 FL (ref 80–94)
MONOCYTES NFR BLD MANUAL: 14 % (ref 5–12)
PLATELET # BLD AUTO: 235 K/UL (ref 140–450)
POTASSIUM SERPL-SCNC: 3.7 MMOL/L (ref 3.5–5.1)
RBC # BLD AUTO: 4.01 MIL/UL (ref 4.2–6.1)
SODIUM SERPL-SCNC: 146 MMOL/L (ref 136–145)
WBC # BLD AUTO: 4.9 K/UL (ref 4.8–10.8)

## 2022-05-21 RX ADMIN — DEXTROSE AND SODIUM CHLORIDE SCH MLS/HR: 5; .9 INJECTION, SOLUTION INTRAVENOUS at 06:45

## 2022-05-21 RX ADMIN — PANTOPRAZOLE SODIUM SCH MG: 40 INJECTION, POWDER, FOR SOLUTION INTRAVENOUS at 10:26

## 2022-05-21 NOTE — NUR
RECEIVED PT REPORT FROM AM SHIFT NURSE FOR CONTINUITY OF CARE. PT A&O X 4, BREATHING EQUAL 
AND UNLABORED.ILEOSTOMY ON PLACE,  DRAINED 450ML AT START OF SHIFT. DENIES PAIN AT THIS 
TIME. NO DISTRESS NOTED. ALL PRECAUTIONS IN PLACE.CALL LIGHT WITHIN REACH. WILL CONTINUE TO 
MONITOR.

## 2022-05-21 NOTE — NUR
RECEIVED ENDORSEMENT FROM PM SHIFT NURSE THAT PATIENT IS REST IN BED, PIV LFA 22G PATENT W/ 
D5NS INFUSING AT 125ML/HR. COLOSTOMY BAG EMPTY 1000ML LIQUID BM AT 0800. WILL CONTINUE TO 
MONITOR

## 2022-05-21 NOTE — NUR
DR. WORTHINGTON IS HERE TO CHECK PATIENT'S STATUS AND HOW PATIENT TOLERATE SOFT DIET.  WILL 
CONTINUE TO MONITOR.

## 2022-05-21 NOTE — NUR
PT VITAL SIGNS STABLE. RESPIRATIONS EQUAL AND UNLABORED, NO DISTRESS NOTED. ALL PRECAUTIONS 
IN PLACE.CALL LIGHT WITHIN REACH. WILL CONTINUE TO MONITOR.

## 2022-05-22 VITALS — SYSTOLIC BLOOD PRESSURE: 142 MMHG | DIASTOLIC BLOOD PRESSURE: 91 MMHG

## 2022-05-22 VITALS — SYSTOLIC BLOOD PRESSURE: 155 MMHG | DIASTOLIC BLOOD PRESSURE: 98 MMHG

## 2022-05-22 VITALS — DIASTOLIC BLOOD PRESSURE: 91 MMHG | SYSTOLIC BLOOD PRESSURE: 142 MMHG

## 2022-05-22 VITALS — DIASTOLIC BLOOD PRESSURE: 93 MMHG | SYSTOLIC BLOOD PRESSURE: 148 MMHG

## 2022-05-22 VITALS — DIASTOLIC BLOOD PRESSURE: 94 MMHG | SYSTOLIC BLOOD PRESSURE: 137 MMHG

## 2022-05-22 LAB
ANION GAP SERPL CALCULATED.3IONS-SCNC: 9.6 MMOL/L (ref 8–16)
BASOPHILS # BLD AUTO: 0 K/UL (ref 0–0.22)
BASOPHILS NFR BLD AUTO: 0.8 % (ref 0–2)
BUN SERPL-MCNC: 19 MG/DL (ref 7–18)
CHLORIDE SERPL-SCNC: 112 MMOL/L (ref 98–107)
CO2 SERPL-SCNC: 28 MMOL/L (ref 21–32)
CREAT SERPL-MCNC: 1.2 MG/DL (ref 0.6–1.3)
EOSINOPHIL # BLD AUTO: 0.1 K/UL (ref 0–0.4)
EOSINOPHIL NFR BLD AUTO: 1 % (ref 0–4)
ERYTHROCYTE [DISTWIDTH] IN BLOOD BY AUTOMATED COUNT: 15.5 % (ref 11.6–13.7)
GFR SERPL CREATININE-BSD FRML MDRD: 80 ML/MIN (ref 90–?)
GLUCOSE SERPL-MCNC: 90 MG/DL (ref 74–106)
HCT VFR BLD AUTO: 32.6 % (ref 36–52)
HGB BLD-MCNC: 11 G/DL (ref 12–18)
LYMPHOCYTES # BLD AUTO: 3.3 K/UL (ref 2–11.5)
LYMPHOCYTES NFR BLD AUTO: 52.6 % (ref 20.5–51.1)
MCH RBC QN AUTO: 29 PG (ref 27–31)
MCHC RBC AUTO-ENTMCNC: 34 G/DL (ref 33–37)
MCV RBC AUTO: 86.4 FL (ref 80–94)
MONOCYTES # BLD AUTO: 0.6 K/UL (ref 0.8–1)
MONOCYTES NFR BLD AUTO: 10 % (ref 1.7–9.3)
NEUTROPHILS # BLD AUTO: 2.2 K/UL (ref 1.8–7.7)
NEUTROPHILS NFR BLD AUTO: 35.6 % (ref 42.2–75.2)
PLATELET # BLD AUTO: 204 K/UL (ref 140–450)
POTASSIUM SERPL-SCNC: 3.6 MMOL/L (ref 3.5–5.1)
RBC # BLD AUTO: 3.78 MIL/UL (ref 4.2–6.1)
SODIUM SERPL-SCNC: 146 MMOL/L (ref 136–145)
WBC # BLD AUTO: 6.3 K/UL (ref 4.8–10.8)

## 2022-05-22 RX ADMIN — PANTOPRAZOLE SODIUM SCH MG: 40 INJECTION, POWDER, FOR SOLUTION INTRAVENOUS at 08:41

## 2022-05-22 NOTE — NUR
PT IS STABLE. NO ACUTE EVENTS THROUGHOUT THE NIGHT. ALL NEEDS MET.NO S/SX OF DISTRESS 
NOTED.DENIES PAIN. ALL PRECAUTIONS IN PLACE. CALL LIGHT WITHIN REACH. WILL ENDORSE TO AM 
SHIFT NURSE.

## 2022-05-22 NOTE — NUR
RECEIVED REPORT FROM NIGHT SHIFT NURSE. PT IS SLEEPING, VISIBLE CHEST RISE AND FALL NOTED. 
IV SITE ON LEFT FA, 20G, SALINE LOCKED.PT HAS ILEOSTOMY. CALL LIGHT WITHIN REACH, ALL SAFETY 
MEASURES DONE. DISCUSSED PLAN OF CARE.

## 2022-05-22 NOTE — NUR
CALLED DR VALENTE, ASKED IF PT CLEAR TO GO HOME.  SAID PT IS CLEARED TO GO HOME, AND WAIT 
FOR DR. WORTHINGTON TO CLEAR PT TOO.

## 2022-05-22 NOTE — NUR
PT ASLEEP.VISIBLE CHEST RISE AND FALL NOTED. NO DISTRESS NOTED. ALL PRECAUTIONS IN PLACE. 
WILL CONTINUE TO MONITOR.

## 2022-05-22 NOTE — NUR
DISCHARGED PT TO HOME. WHEELED PT TO LOBBY, SPOUSE PICKED HIM UP. ARM BAND REMOVED. IV SITE 
REMOVED AND BLEEDING CONTROLLED. PT CHANGED TO HER OWN CLOTHING. PT TOOK ALL PERSONAL 
BELONGINGS WITH HIM. PT IS STABLE TO GO HOME.

## 2023-09-27 NOTE — NUR
Have spoken with Stacy Castillo, Nurse for DR ALLEN [facility] office, in [id number] [place] [address].  7 . She provided the name of the Iron which they last ordered for infusion for this patient. Monoferric iron. Called patient, left  for return call to advise we would be able to give this medication. Requested a call back. PT IS IN BED, RESTING WITH WIFE AT BED SIDE.NO DISTRESS NOTED. DENIES PAIN. WILL CONTINUE TO 
MONITOR.